# Patient Record
Sex: FEMALE | Race: WHITE | Employment: FULL TIME | URBAN - METROPOLITAN AREA
[De-identification: names, ages, dates, MRNs, and addresses within clinical notes are randomized per-mention and may not be internally consistent; named-entity substitution may affect disease eponyms.]

---
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
## 2019-12-01 ENCOUNTER — HOSPITAL ENCOUNTER (EMERGENCY)
Age: 19
Discharge: HOME | End: 2019-12-01
Attending: NURSE PRACTITIONER
Payer: COMMERCIAL

## 2019-12-01 VITALS
WEIGHT: 120 LBS | TEMPERATURE: 97.9 F | HEART RATE: 69 BPM | OXYGEN SATURATION: 98 % | RESPIRATION RATE: 16 BRPM | SYSTOLIC BLOOD PRESSURE: 113 MMHG | BODY MASS INDEX: 19.37 KG/M2 | DIASTOLIC BLOOD PRESSURE: 75 MMHG

## 2019-12-01 DIAGNOSIS — J02.9 ACUTE PHARYNGITIS, UNSPECIFIED ETIOLOGY: ICD-10-CM

## 2019-12-01 DIAGNOSIS — J01.90 ACUTE NON-RECURRENT SINUSITIS, UNSPECIFIED LOCATION: Primary | ICD-10-CM

## 2019-12-01 PROCEDURE — 99212 OFFICE O/P EST SF 10 MIN: CPT

## 2019-12-01 RX ORDER — AMOXICILLIN 500 MG/1
500 CAPSULE ORAL 3 TIMES DAILY
Qty: 30 CAPSULE | Refills: 0 | Status: SHIPPED | OUTPATIENT
Start: 2019-12-01 | End: 2019-12-11

## 2019-12-01 RX ORDER — METHYLPREDNISOLONE 4 MG/1
TABLET ORAL
Qty: 21 TABLET | Status: SHIPPED | OUTPATIENT
Start: 2019-12-01 | End: 2019-12-07

## 2020-08-04 ENCOUNTER — HOSPITAL ENCOUNTER (OUTPATIENT)
Age: 20
Discharge: HOME | End: 2020-08-06
Payer: COMMERCIAL

## 2020-08-04 ENCOUNTER — OFFICE VISIT (OUTPATIENT)
Dept: PRIMARY CARE CLINIC | Age: 20
End: 2020-08-04

## 2020-08-04 VITALS
WEIGHT: 126 LBS | HEIGHT: 66 IN | SYSTOLIC BLOOD PRESSURE: 118 MMHG | DIASTOLIC BLOOD PRESSURE: 72 MMHG | OXYGEN SATURATION: 97 % | BODY MASS INDEX: 20.25 KG/M2 | TEMPERATURE: 98.3 F | HEART RATE: 80 BPM

## 2020-08-04 PROBLEM — K51.90 ULCERATIVE COLITIS (HCC): Status: ACTIVE | Noted: 2020-08-04

## 2020-08-04 PROCEDURE — 86706 HEP B SURFACE ANTIBODY: CPT

## 2020-08-04 PROCEDURE — 86580 TB INTRADERMAL TEST: CPT | Performed by: FAMILY MEDICINE

## 2020-08-04 PROCEDURE — 86735 MUMPS ANTIBODY: CPT

## 2020-08-04 PROCEDURE — 86762 RUBELLA ANTIBODY: CPT

## 2020-08-04 PROCEDURE — 86787 VARICELLA-ZOSTER ANTIBODY: CPT

## 2020-08-04 PROCEDURE — 99213 OFFICE O/P EST LOW 20 MIN: CPT | Performed by: FAMILY MEDICINE

## 2020-08-04 PROCEDURE — 86765 RUBEOLA ANTIBODY: CPT

## 2020-08-04 RX ORDER — LEVONORGESTREL / ETHINYL ESTRADIOL AND ETHINYL ESTRADIOL 150-30(84)
KIT ORAL
COMMUNITY
Start: 2020-06-08 | End: 2021-03-24

## 2020-08-04 ASSESSMENT — ENCOUNTER SYMPTOMS
EYE REDNESS: 0
COUGH: 0
DIARRHEA: 0
ABDOMINAL PAIN: 0
COLOR CHANGE: 0
VOMITING: 0
CONSTIPATION: 0
NAUSEA: 0
BACK PAIN: 0
WHEEZING: 0
SHORTNESS OF BREATH: 0
CHEST TIGHTNESS: 0
BLOOD IN STOOL: 0

## 2020-08-04 NOTE — PROGRESS NOTES
Roberto Morgan  : 2000    Chief Complaint:     Chief Complaint   Patient presents with    School/Camp Physical     HPI  Physical for Nursing. History reviewed and no significant medical problems. Has UC but has been asymptomatic for 3 years, follows w GI, no meds. No physical limitations or chronic joint/back pains. No cough, hemoptysis etc symptoms/signs of communicable disease. See form in media. Tdap 2012. Past medical, surgical, family and social histories reviewed and updated today as appropriate    Health Maintenance Due   Topic Date Due    Varicella vaccine (1 of 2 - 2-dose childhood series) 2000    HPV vaccine (1 - 2-dose series) 2010    HIV screen  2014    Chlamydia screen  2015    DTaP/Tdap/Td vaccine (1 - Tdap) 2018       Current Outpatient Medications   Medication Sig Dispense Refill    Levonorgest-Eth Estrad 91-Day 0.15-0.03 &0.01 MG TABS TAKE 1 TABLET BY MOUTH EVERY DAY AS DIRECTED      loratadine-pseudoephedrine (CLARITIN-D 24 HOUR)  MG per extended release tablet Take 1 tablet by mouth daily (Patient not taking: Reported on 2020) 12 tablet 0     No current facility-administered medications for this visit. No Known Allergies      REVIEW OF SYSTEMS  Review of Systems   Constitutional: Negative for activity change, appetite change, chills, diaphoresis, fatigue and fever. HENT: Negative. Eyes: Negative for redness and visual disturbance. Respiratory: Negative for cough, chest tightness, shortness of breath and wheezing. Cardiovascular: Negative for chest pain, palpitations and leg swelling. Gastrointestinal: Negative for abdominal pain, blood in stool, constipation, diarrhea, nausea and vomiting. Endocrine: Negative. Negative for cold intolerance, heat intolerance, polydipsia, polyphagia and polyuria. Genitourinary: Negative. Musculoskeletal: Negative. Negative for arthralgias, back pain and myalgias. Skin: Negative for color change, pallor and rash. Neurological: Negative for dizziness, tremors, syncope, weakness, light-headedness, numbness and headaches. Psychiatric/Behavioral: Negative for confusion and dysphoric mood. The patient is not nervous/anxious. All other systems reviewed and are negative. PHYSICAL EXAM  /72   Pulse 80   Temp 98.3 °F (36.8 °C)   Ht 5' 6\" (1.676 m)   Wt 126 lb (57.2 kg)   SpO2 97%   BMI 20.34 kg/m²   Physical Exam  Vitals signs and nursing note reviewed. Constitutional:       General: She is not in acute distress. Appearance: Normal appearance. She is well-developed. She is not ill-appearing or diaphoretic. HENT:      Head: Normocephalic and atraumatic. Right Ear: Tympanic membrane, ear canal and external ear normal.      Left Ear: Tympanic membrane, ear canal and external ear normal.      Nose: Nose normal. No congestion or rhinorrhea. Mouth/Throat:      Mouth: Mucous membranes are moist.      Pharynx: Oropharynx is clear. No oropharyngeal exudate or posterior oropharyngeal erythema. Eyes:      General: No scleral icterus. Extraocular Movements: Extraocular movements intact. Conjunctiva/sclera: Conjunctivae normal.      Pupils: Pupils are equal, round, and reactive to light. Neck:      Musculoskeletal: Neck supple. Thyroid: No thyromegaly. Vascular: No JVD. Cardiovascular:      Rate and Rhythm: Normal rate and regular rhythm. Heart sounds: Normal heart sounds. No murmur. No friction rub. No gallop. Pulmonary:      Effort: Pulmonary effort is normal. No respiratory distress. Breath sounds: Normal breath sounds. No stridor. No wheezing, rhonchi or rales. Abdominal:      General: Bowel sounds are normal. There is no distension. Palpations: Abdomen is soft. There is no mass. Tenderness: There is no abdominal tenderness. There is no guarding or rebound. Hernia: No hernia is present.

## 2020-08-05 LAB — HBV SURFACE AB TITR SER: REACTIVE {TITER}

## 2020-08-06 LAB
INDURATION: NORMAL
MEASLES IMMUNE (IGG): NORMAL
MUMPS AB IGG: NORMAL
RUBELLA ANTIBODY IGG: NORMAL
TB SKIN TEST: NORMAL
VARICELLA-ZOSTER VIRUS AB, IGG: NORMAL

## 2020-08-17 ENCOUNTER — NURSE ONLY (OUTPATIENT)
Dept: PRIMARY CARE CLINIC | Age: 20
End: 2020-08-17
Payer: COMMERCIAL

## 2020-08-17 ENCOUNTER — NURSE ONLY (OUTPATIENT)
Dept: PRIMARY CARE CLINIC | Age: 20
End: 2020-08-17

## 2020-08-17 PROCEDURE — 90716 VAR VACCINE LIVE SUBQ: CPT | Performed by: NURSE PRACTITIONER

## 2020-08-17 PROCEDURE — 86580 TB INTRADERMAL TEST: CPT | Performed by: NURSE PRACTITIONER

## 2020-08-17 PROCEDURE — 90471 IMMUNIZATION ADMIN: CPT | Performed by: NURSE PRACTITIONER

## 2020-08-20 LAB
INDURATION: NORMAL
TB SKIN TEST: NORMAL

## 2020-09-17 ENCOUNTER — NURSE ONLY (OUTPATIENT)
Dept: PRIMARY CARE CLINIC | Age: 20
End: 2020-09-17
Payer: COMMERCIAL

## 2020-09-17 PROCEDURE — 90471 IMMUNIZATION ADMIN: CPT | Performed by: FAMILY MEDICINE

## 2020-09-17 PROCEDURE — 90716 VAR VACCINE LIVE SUBQ: CPT | Performed by: FAMILY MEDICINE

## 2020-09-17 NOTE — PATIENT INSTRUCTIONS
Patient Education        Varicella (Chickenpox) Vaccine: What You Need to Know  Why get vaccinated? Varicella vaccine can prevent chickenpox. Chickenpox can cause an itchy rash that usually lasts about a week. It can also cause fever, tiredness, loss of appetite, and headache. It can lead to skin infections, pneumonia, inflammation of the blood vessels, and swelling of the brain and/or spinal cord covering, and infections of the bloodstream, bone, or joints. Some people who get chickenpox get a painful rash called shingles (also known as herpes zoster) years later. Chickenpox is usually mild but it can be serious in infants under 15months of age, adolescents, adults, pregnant women, and people with a weakened immune system. Some people get so sick that they need to be hospitalized. It doesn't happen often, but people can die from chickenpox. Most people who are vaccinated with 2 doses of varicella vaccine will be protected for life. Varicella vaccine  Children need 2 doses of varicella vaccine, usually:  · First dose: 12 through 13months of age  · Second dose: 4 through 10years of age  Older children, adolescents, and adults also need 2 doses of varicella vaccine if they are not already immune to chickenpox. Varicella vaccine may be given at the same time as other vaccines. Also, a child between 13 months and 15years of age might receive varicella vaccine together with MMR (measles, mumps, and rubella) vaccine in a single shot, known as MMRV. Your health care provider can give you more information. Talk with your health care provider  Tell your vaccine provider if the person getting the vaccine:  · Has had an allergic reaction after a previous dose of varicella vaccine, or has any severe, life-threatening allergies. · Is pregnant, or thinks she might be pregnant. · Has a weakened immune system, or has a parent, brother, or sister with a history of hereditary or congenital immune system problems.   · Is taking salicylates (such as aspirin). · Has recently had a blood transfusion or received other blood products. · Has tuberculosis. · Has gotten any other vaccines in the past 4 weeks. In some cases, your health care provider may decide to postpone varicella vaccination to a future visit. People with minor illnesses, such as a cold, may be vaccinated. People who are moderately or severely ill should usually wait until they recover before getting varicella vaccine. Your health care provider can give you more information. Risks of a vaccine reaction  · Sore arm from the injection, fever, or redness or rash where the shot is given can happen after varicella vaccine. · More serious reactions happen very rarely. These can include pneumonia, infection of the brain and/or spinal cord covering, or seizures that are often associated with fever. · In people with serious immune system problems, this vaccine may cause an infection which may be life-threatening. People with serious immune system problems should not get varicella vaccine. It is possible for a vaccinated person to develop a rash. If this happens, the varicella vaccine virus could be spread to an unprotected person. Anyone who gets a rash should stay away from people with a weakened immune system and infants until the rash goes away. Talk with your health care provider to learn more. Some people who are vaccinated against chickenpox get shingles (herpes zoster) years later. This is much less common after vaccination than after chickenpox disease. People sometimes faint after medical procedures, including vaccination. Tell your provider if you feel dizzy or have vision changes or ringing in the ears. As with any medicine, there is a very remote chance of a vaccine causing a severe allergic reaction, other serious injury, or death. What if there is a serious problem? An allergic reaction could occur after the vaccinated person leaves the clinic.  If you see signs of a severe allergic reaction (hives, swelling of the face and throat, difficulty breathing, a fast heartbeat, dizziness, or weakness), call 9-1-1 and get the person to the nearest hospital.  For other signs that concern you, call your health care provider. Adverse reactions should be reported to the Vaccine Adverse Event Reporting System (VAERS). Your health care provider will usually file this report, or you can do it yourself. Visit the VAERS website at www.vaers. [facility].gov or call 4-304.579.7851. VAERS is only for reporting reactions, and VAERS staff do not give medical advice. The National Vaccine Injury Compensation Program  The National Vaccine Injury Compensation Program (VICP) is a federal program that was created to compensate people who may have been injured by certain vaccines. Visit the VICP website at www.hrsa.gov/vaccinecompensation or call 8-944.791.8841 to learn about the program and about filing a claim. There is a time limit to file a claim for compensation. How can I learn more? · Ask your health care provider. · Call your local or state health department. · Contact the Centers for Disease Control and Prevention (CDC):  ? Call 0-374.924.2095 (1-800-CDC-INFO) or  ? Visit CDC's www.cdc.gov/vaccines  Vaccine Information Statement (Interim)  Varicella Vaccine  08-  42 LINDA Analilia Haro 173QH-06  Department of Health and Human Services  Centers for Disease Control and Prevention  Many Vaccine Information Statements are available in [place] and other languages. See www.immunize.org/vis  Hojas de información sobre vacunas están disponibles en español y en muchos otros idiomas. Visite www.immunize.org/vis  Care instructions adapted under license by [facility] ([facility]). If you have questions about a medical condition or this instruction, always ask your healthcare professional. [address] any warranty or liability for your use of this information.

## 2020-10-09 ENCOUNTER — NURSE ONLY (OUTPATIENT)
Dept: PRIMARY CARE CLINIC | Age: 20
End: 2020-10-09
Payer: COMMERCIAL

## 2020-10-09 PROCEDURE — 90688 IIV4 VACCINE SPLT 0.5 ML IM: CPT | Performed by: FAMILY MEDICINE

## 2020-10-09 PROCEDURE — 90471 IMMUNIZATION ADMIN: CPT | Performed by: FAMILY MEDICINE

## 2021-06-03 ENCOUNTER — OFFICE VISIT (OUTPATIENT)
Dept: PRIMARY CARE CLINIC | Age: 21
End: 2021-06-03
Payer: COMMERCIAL

## 2021-06-03 VITALS
SYSTOLIC BLOOD PRESSURE: 100 MMHG | HEIGHT: 66 IN | BODY MASS INDEX: 19.61 KG/M2 | TEMPERATURE: 98.2 F | HEART RATE: 76 BPM | OXYGEN SATURATION: 98 % | WEIGHT: 122 LBS | DIASTOLIC BLOOD PRESSURE: 60 MMHG

## 2021-06-03 DIAGNOSIS — H73.92 TYMPANIC MEMBRANE DISORDER, LEFT: Primary | ICD-10-CM

## 2021-06-03 PROCEDURE — 99213 OFFICE O/P EST LOW 20 MIN: CPT | Performed by: NURSE PRACTITIONER

## 2021-06-03 ASSESSMENT — ENCOUNTER SYMPTOMS
SHORTNESS OF BREATH: 0
CONSTIPATION: 0
NAUSEA: 0
DIARRHEA: 0
COUGH: 0
VOMITING: 0
WHEEZING: 0

## 2021-06-03 NOTE — PROGRESS NOTES
Chief Complaint   Patient presents with    Ear Fullness     Lt ear x 3 days       HPI:  Patient presents today for  Complaints of left ear fullness x 3 days. Reports that she can hear a heartbeat in her ear. Denies any nasal congestion, runny nose or cough. Denies fever/chills or sick contacts. Prior to Visit Medications    Medication Sig Taking? Authorizing Provider   desogestrel-ethinyl estradiol (VELIVET) 0.1/0.125/0.15 -0.025 MG tablet Take 1 tablet by mouth daily Yes Wanda Delgado, APRN - CNP         No Known Allergies      Review of Systems  Review of Systems   Constitutional: Negative for chills and fever. HENT: Negative for congestion and nosebleeds. See HPI   Respiratory: Negative for cough, shortness of breath and wheezing. Cardiovascular: Negative for chest pain, palpitations and leg swelling. Gastrointestinal: Negative for constipation, diarrhea, nausea and vomiting. Genitourinary: Negative for dysuria and urgency. Musculoskeletal: Negative for neck pain. Neurological: Negative for headaches. VS:  /60   Pulse 76   Temp 98.2 °F (36.8 °C)   Ht 5' 6\" (1.676 m)   Wt 122 lb (55.3 kg)   SpO2 98%   BMI 19.69 kg/m²     Patient's medical, social, and family history reviewed      Physical Exam  Physical Exam  Constitutional:       Appearance: She is well-developed. HENT:      Head: Normocephalic. Left Ear: Tympanic membrane is retracted. Eyes:      Pupils: Pupils are equal, round, and reactive to light. Neck:      Thyroid: No thyromegaly. Cardiovascular:      Rate and Rhythm: Normal rate and regular rhythm. Pulmonary:      Effort: Pulmonary effort is normal.      Breath sounds: Normal breath sounds. Abdominal:      General: Bowel sounds are normal.      Palpations: Abdomen is soft. Musculoskeletal:         General: Normal range of motion. Cervical back: Normal range of motion and neck supple.    Lymphadenopathy:      Cervical: No cervical adenopathy. Skin:     General: Skin is warm and dry. Neurological:      Mental Status: She is alert and oriented to person, place, and time. Psychiatric:         Behavior: Behavior normal.           Assessment/Plan:    1.  Tympanic membrane disorder, left  OTC sudafed recommended for patient  Follow up if no improvement     Shaquille Chapman, [address] [facility] Nurse Practitioner          Shaquille Chapman, APRN - CNP

## 2021-08-03 ENCOUNTER — NURSE ONLY (OUTPATIENT)
Dept: PRIMARY CARE CLINIC | Age: 21
End: 2021-08-03
Payer: COMMERCIAL

## 2021-08-03 DIAGNOSIS — Z11.1 PPD SCREENING TEST: Primary | ICD-10-CM

## 2021-08-03 PROCEDURE — 86580 TB INTRADERMAL TEST: CPT | Performed by: NURSE PRACTITIONER

## 2021-08-05 LAB
INDURATION: NORMAL
TB SKIN TEST: NORMAL

## 2021-09-02 ENCOUNTER — OFFICE VISIT (OUTPATIENT)
Dept: PRIMARY CARE CLINIC | Age: 21
End: 2021-09-02

## 2021-09-02 VITALS
WEIGHT: 122 LBS | TEMPERATURE: 97.1 F | HEIGHT: 66 IN | OXYGEN SATURATION: 96 % | SYSTOLIC BLOOD PRESSURE: 128 MMHG | DIASTOLIC BLOOD PRESSURE: 76 MMHG | HEART RATE: 84 BPM | BODY MASS INDEX: 19.61 KG/M2 | RESPIRATION RATE: 16 BRPM

## 2021-09-02 DIAGNOSIS — R39.9 UTI SYMPTOMS: Primary | ICD-10-CM

## 2021-09-02 LAB
BILIRUBIN, POC: NEGATIVE
BLOOD URINE, POC: NEGATIVE
CLARITY, POC: CLEAR
COLOR, POC: YELLOW
CONTROL: NORMAL
GLUCOSE URINE, POC: NEGATIVE
KETONES, POC: NEGATIVE
LEUKOCYTE EST, POC: NEGATIVE
NITRITE, POC: NEGATIVE
PH, POC: 7
PREGNANCY TEST URINE, POC: NEGATIVE
PROTEIN, POC: NEGATIVE
SPECIFIC GRAVITY, POC: 1.02
UROBILINOGEN, POC: 0.2

## 2021-09-02 PROCEDURE — 81025 URINE PREGNANCY TEST: CPT | Performed by: NURSE PRACTITIONER

## 2021-09-02 PROCEDURE — 81002 URINALYSIS NONAUTO W/O SCOPE: CPT | Performed by: NURSE PRACTITIONER

## 2021-09-02 PROCEDURE — 99214 OFFICE O/P EST MOD 30 MIN: CPT | Performed by: NURSE PRACTITIONER

## 2021-09-02 RX ORDER — NITROFURANTOIN 25; 75 MG/1; MG/1
100 CAPSULE ORAL 2 TIMES DAILY
Qty: 10 CAPSULE | Refills: 0 | Status: SHIPPED | OUTPATIENT
Start: 2021-09-02 | End: 2021-09-07

## 2021-09-02 NOTE — PROGRESS NOTES
21  Eduar Montelongo : 2000 Sex: female  Age 24 y.o. Subjective:  Chief Complaint   Patient presents with    Abdominal Pain     x2 days     Urinary Frequency    Dysuria       HPI:   Eduar Montelongo , 24 y.o. female presents to the clinic for evaluation of UTI symptoms x 2 days. Reports associated frequency, urgency, and suprapubic pressure. The patient denies taking any treatment for symptoms. The patient reports unchanged symptoms over time. Denies gross hematuria. Mild associated bilateral flank pain. Denies vaginal discharge, vaginal bleeding, possibility of pregnancy, or lethargy. The patient also denies headache, fever, chest pain, abdominal pain, shortness of breath, and nausea / vomiting / diarrhea. Patient's last menstrual period was 2021. ROS:   Unless otherwise stated in this report the patient's positive and negative responses for review of systems for constitutional, eyes, ENT, cardiovascular, respiratory, gastrointestinal, neurological, , musculoskeletal, and integument systems and related systems to the presenting problem are either stated in the history of present illness or were not pertinent or were negative for the symptoms and/or complaints related to the presenting medical problem. Positives and pertinent negatives as per HPI. All others reviewed and are negative. PMH:     Past Medical History:   Diagnosis Date    Colitis        Past Surgical History:   Procedure Laterality Date    COLONOSCOPY         History reviewed. No pertinent family history.     Medications:     Current Outpatient Medications:     nitrofurantoin, macrocrystal-monohydrate, (MACROBID) 100 MG capsule, Take 1 capsule by mouth 2 times daily for 5 days, Disp: 10 capsule, Rfl: 0    desogestrel-ethinyl estradiol (VELIVET) 0.1/0.125/0.15 -0.025 MG tablet, Take 1 tablet by mouth daily, Disp: 28 tablet, Rfl: 3    Allergies:   No Known Allergies    Social History:     Social History Tobacco Use    Smoking status: Never Smoker    Smokeless tobacco: Never Used   Substance Use Topics    Alcohol use: Yes     Comment: socially    Drug use: No       Patient lives at home. Physical Exam:     Vitals:    09/02/21 1116   BP: 128/76   Pulse: 84   Resp: 16   Temp: 97.1 °F (36.2 °C)   TempSrc: Temporal   SpO2: 96%   Weight: 122 lb (55.3 kg)   Height: 5' 6\" (1.676 m)       Physical Exam (PE)   Constitutional: Alert, development consistent with age. HENT:      Head: Normocephalic. Right Ear: External ear normal.      Left Ear: External ear normal.      Nose: Normal.      Mouth/Throat:     Mouth: Mucous membranes are moist.      Pharynx: Oropharynx is clear. Eyes: Pupils: Pupils are equal, round, and reactive to light. Neck: Normal ROM. Supple. Cardiovascular: Heart RRR without pathologic murmurs or gallops. Pulmonary: Respiratory effort normal.  Normal breath sounds. Abdomen: Soft, nondistended, with mild suprapubic tenderness. No rebound, rigidity, or guarding. BS+ X4. No organomegaly. Back: Negative CVA tenderness. Skin:  Normal turgor. Warm, dry, without visible rash, unless noted elsewhere. Neurological:  Alert and oriented. Motor functions intact. Responds to verbal commands. Psychiatric: Mood and Affect: Mood normal. Behavior: Behavior normal    Testing:   (All laboratory and radiology results have been personally reviewed by myself)  Labs:  Results for orders placed or performed in visit on 09/02/21   POCT Urinalysis no Micro   Result Value Ref Range    Color, UA yellow     Clarity, UA clear     Glucose, UA POC negative     Bilirubin, UA negative     Ketones, UA negative     Spec Grav, UA 1.020     Blood, UA POC negative     pH, UA 7.0     Protein, UA POC negative     Urobilinogen, UA 0.2     Leukocytes, UA negative     Nitrite, UA negative        Imaging: All Radiology results interpreted by Radiologist unless otherwise noted.   No orders to display       Assessment / Plan:   The patient's vitals, allergies, medications, and past medical history have been reviewed. Hyun Chaudhry was seen today for abdominal pain, urinary frequency and dysuria. Diagnoses and all orders for this visit:    UTI symptoms  -     POCT Urinalysis no Micro  -     POCT urine pregnancy  -     Culture, Urine; Future  -     nitrofurantoin, macrocrystal-monohydrate, (MACROBID) 100 MG capsule; Take 1 capsule by mouth 2 times daily for 5 days        - Disposition: Home    - Educational material printed for patient's review and were included in patient instructions. After Visit Summary and given to patient at the end of visit. - Urine C&S pending, will call with results once available. Discussed symptomatic treatments with patient today. Increase fluids and rest. F/u PCP in 3-5 days if symptoms persist. ED sooner if symptoms worsen or change. ED immediately with the development of fever, shaking chills, body aches, flank pain, vomiting, CP, or SOB. Pt is in agreement with this care plan. All questions answered. SIGNATURE: Valentino Amble, APRN-CNP    *NOTE: This report was transcribed using voice recognition software. Every effort was made to ensure accuracy; however, inadvertent computerized transcription errors may be present.

## 2021-09-02 NOTE — PATIENT INSTRUCTIONS
Patient Education        Urinary Tract Infection (UTI) in Women: Care Instructions  Overview     A urinary tract infection, or UTI, is a general term for an infection anywhere between the kidneys and the urethra (where urine comes out). Most UTIs are bladder infections. They often cause pain or burning when you urinate. UTIs are caused by bacteria and can be cured with antibiotics. Be sure to complete your treatment so that the infection does not get worse. Follow-up care is a key part of your treatment and safety. Be sure to make and go to all appointments, and call your doctor if you are having problems. It's also a good idea to know your test results and keep a list of the medicines you take. How can you care for yourself at home? · Take your antibiotics as directed. Do not stop taking them just because you feel better. You need to take the full course of antibiotics. · Drink extra water and other fluids for the next day or two. This will help make the urine less concentrated and help wash out the bacteria that are causing the infection. (If you have kidney, heart, or liver disease and have to limit fluids, talk with your doctor before you increase the amount of fluids you drink.)  · Avoid drinks that are carbonated or have caffeine. They can irritate the bladder. · Urinate often. Try to empty your bladder each time. · To relieve pain, take a hot bath or lay a heating pad set on low over your lower belly or genital area. Never go to sleep with a heating pad in place. To prevent UTIs  · Drink plenty of water each day. This helps you urinate often, which clears bacteria from your system. (If you have kidney, heart, or liver disease and have to limit fluids, talk with your doctor before you increase the amount of fluids you drink.)  · Urinate when you need to. · If you are sexually active, urinate right after you have sex. · Change sanitary pads often.   · Avoid douches, bubble baths, feminine hygiene sprays, and other feminine hygiene products that have deodorants. · After going to the bathroom, wipe from front to back. When should you call for help? Call your doctor now or seek immediate medical care if:    · Symptoms such as fever, chills, nausea, or vomiting get worse or appear for the first time.     · You have new pain in your back just below your rib cage. This is called flank pain.     · There is new blood or pus in your urine.     · You have any problems with your antibiotic medicine. Watch closely for changes in your health, and be sure to contact your doctor if:    · You are not getting better after taking an antibiotic for 2 days.     · Your symptoms go away but then come back. Where can you learn more? Go to [email or web address] org and sign in to your Appier account. Enter M650 in the Fracture box to learn more about \"Urinary Tract Infection (UTI) in Women: Care Instructions. \"     If you do not have an account, please click on the \"Sign Up Now\" link. Current as of: February 10, 2021               Content Version: 12.9  © 0682-5157 Healthwise, Incorporated. Care instructions adapted under license by [facility] ([facility]). If you have questions about a medical condition or this instruction, always ask your healthcare professional. [address] any warranty or liability for your use of this information.

## 2021-09-07 ENCOUNTER — NURSE ONLY (OUTPATIENT)
Dept: PRIMARY CARE CLINIC | Age: 21
End: 2021-09-07
Payer: COMMERCIAL

## 2021-09-07 DIAGNOSIS — Z23 NEED FOR INFLUENZA VACCINATION: Primary | ICD-10-CM

## 2021-09-07 PROCEDURE — 90756 CCIIV4 VACC ABX FREE IM: CPT | Performed by: FAMILY MEDICINE

## 2021-09-07 PROCEDURE — 90471 IMMUNIZATION ADMIN: CPT | Performed by: FAMILY MEDICINE

## 2021-12-02 ENCOUNTER — IMMUNIZATION (OUTPATIENT)
Dept: PRIMARY CARE CLINIC | Age: 21
End: 2021-12-02
Payer: COMMERCIAL

## 2021-12-02 PROCEDURE — 0064A COVID-19, MODERNA BOOSTER VACCINE 0.25ML DOSE: CPT | Performed by: NURSE PRACTITIONER

## 2021-12-02 PROCEDURE — 91306 COVID-19, MODERNA BOOSTER VACCINE 0.25ML DOSE: CPT | Performed by: NURSE PRACTITIONER

## 2022-04-10 SDOH — HEALTH STABILITY: PHYSICAL HEALTH: ON AVERAGE, HOW MANY DAYS PER WEEK DO YOU ENGAGE IN MODERATE TO STRENUOUS EXERCISE (LIKE A BRISK WALK)?: 4 DAYS

## 2022-04-10 SDOH — HEALTH STABILITY: PHYSICAL HEALTH: ON AVERAGE, HOW MANY MINUTES DO YOU ENGAGE IN EXERCISE AT THIS LEVEL?: 50 MIN

## 2022-04-10 ASSESSMENT — SOCIAL DETERMINANTS OF HEALTH (SDOH)
WITHIN THE LAST YEAR, HAVE YOU BEEN AFRAID OF YOUR PARTNER OR EX-PARTNER?: NO
WITHIN THE LAST YEAR, HAVE YOU BEEN KICKED, HIT, SLAPPED, OR OTHERWISE PHYSICALLY HURT BY YOUR PARTNER OR EX-PARTNER?: NO
WITHIN THE LAST YEAR, HAVE YOU BEEN HUMILIATED OR EMOTIONALLY ABUSED IN OTHER WAYS BY YOUR PARTNER OR EX-PARTNER?: NO
WITHIN THE LAST YEAR, HAVE TO BEEN RAPED OR FORCED TO HAVE ANY KIND OF SEXUAL ACTIVITY BY YOUR PARTNER OR EX-PARTNER?: NO

## 2022-04-11 ENCOUNTER — HOSPITAL ENCOUNTER (OUTPATIENT)
Dept: CT IMAGING | Age: 22
Discharge: HOME | End: 2022-04-13
Payer: COMMERCIAL

## 2022-04-11 ENCOUNTER — OFFICE VISIT (OUTPATIENT)
Dept: PRIMARY CARE CLINIC | Age: 22
End: 2022-04-11
Payer: COMMERCIAL

## 2022-04-11 VITALS
BODY MASS INDEX: 20.25 KG/M2 | TEMPERATURE: 97.8 F | HEART RATE: 80 BPM | RESPIRATION RATE: 14 BRPM | OXYGEN SATURATION: 99 % | SYSTOLIC BLOOD PRESSURE: 100 MMHG | HEIGHT: 66 IN | DIASTOLIC BLOOD PRESSURE: 60 MMHG | WEIGHT: 126 LBS

## 2022-04-11 DIAGNOSIS — K51.911 ULCERATIVE COLITIS WITH RECTAL BLEEDING, UNSPECIFIED LOCATION (HCC): Primary | ICD-10-CM

## 2022-04-11 DIAGNOSIS — Z76.89 ENCOUNTER TO ESTABLISH CARE: ICD-10-CM

## 2022-04-11 DIAGNOSIS — K51.911 ULCERATIVE COLITIS WITH RECTAL BLEEDING, UNSPECIFIED LOCATION (HCC): ICD-10-CM

## 2022-04-11 DIAGNOSIS — L70.0 ACNE VULGARIS: ICD-10-CM

## 2022-04-11 PROCEDURE — 74177 CT ABD & PELVIS W/CONTRAST: CPT

## 2022-04-11 PROCEDURE — 2580000003 HC RX 258: Performed by: RADIOLOGY

## 2022-04-11 PROCEDURE — 6360000004 HC RX CONTRAST MEDICATION: Performed by: RADIOLOGY

## 2022-04-11 PROCEDURE — 99204 OFFICE O/P NEW MOD 45 MIN: CPT | Performed by: PHYSICIAN ASSISTANT

## 2022-04-11 RX ORDER — SODIUM CHLORIDE 0.9 % (FLUSH) 0.9 %
10 SYRINGE (ML) INJECTION PRN
Status: DISCONTINUED | OUTPATIENT
Start: 2022-04-11 | End: 2022-04-14 | Stop reason: HOSPADM

## 2022-04-11 RX ADMIN — IOPAMIDOL 75 ML: 755 INJECTION, SOLUTION INTRAVENOUS at 15:44

## 2022-04-11 RX ADMIN — SODIUM CHLORIDE, PRESERVATIVE FREE 10 ML: 5 INJECTION INTRAVENOUS at 15:44

## 2022-04-11 SDOH — ECONOMIC STABILITY: FOOD INSECURITY: WITHIN THE PAST 12 MONTHS, THE FOOD YOU BOUGHT JUST DIDN'T LAST AND YOU DIDN'T HAVE MONEY TO GET MORE.: NEVER TRUE

## 2022-04-11 SDOH — ECONOMIC STABILITY: FOOD INSECURITY: WITHIN THE PAST 12 MONTHS, YOU WORRIED THAT YOUR FOOD WOULD RUN OUT BEFORE YOU GOT MONEY TO BUY MORE.: NEVER TRUE

## 2022-04-11 ASSESSMENT — PATIENT HEALTH QUESTIONNAIRE - PHQ9
SUM OF ALL RESPONSES TO PHQ QUESTIONS 1-9: 0
2. FEELING DOWN, DEPRESSED OR HOPELESS: 0
1. LITTLE INTEREST OR PLEASURE IN DOING THINGS: 0
SUM OF ALL RESPONSES TO PHQ QUESTIONS 1-9: 0
SUM OF ALL RESPONSES TO PHQ9 QUESTIONS 1 & 2: 0

## 2022-04-11 ASSESSMENT — SOCIAL DETERMINANTS OF HEALTH (SDOH): HOW HARD IS IT FOR YOU TO PAY FOR THE VERY BASICS LIKE FOOD, HOUSING, MEDICAL CARE, AND HEATING?: NOT HARD AT ALL

## 2022-04-11 NOTE — PROGRESS NOTES
Spoke with patient. Having a possible flare of ulcerative colitis. She was a previous pediatric patient. Has not had treatment since 2018. Informed patient to come into office tomorrow in [facility]. Will evaluate patient at that time.

## 2022-04-11 NOTE — PROGRESS NOTES
HPI:  The patient is a 24 y.o. female who presents today to establish care. The patient complains of rectal bleeding for 2 weeks. She has mucus with stool and bright red blood. She has mild left lower quadrant pain. She has a hx of Ulcerative Colitis. She was seeing a pediatric GI, but it has been several years. She believes her last colonoscopy was in 2018. She was weaned off meds, and she has done well until now. She has not had fevers. Past Medical History:   Diagnosis Date    Colitis       Past Surgical History:   Procedure Laterality Date    COLONOSCOPY        History reviewed. No pertinent family history. Social History     Socioeconomic History    Marital status: Single     Spouse name: Not on file    Number of children: Not on file    Years of education: Not on file    Highest education level: Not on file   Occupational History    Not on file   Tobacco Use    Smoking status: Never Smoker    Smokeless tobacco: Never Used   Substance and Sexual Activity    Alcohol use: Yes     Comment: socially    Drug use: No    Sexual activity: Never   Other Topics Concern    Not on file   Social History Narrative    Not on file     Social Determinants of Health     Financial Resource Strain: Low Risk     Difficulty of Paying Living Expenses: Not hard at all   Food Insecurity: No Food Insecurity    Worried About [facility] in the Last Year: Never true    [facility] in the Last Year: Never true   Transportation Needs:     Lack of Transportation (Medical): Not on file    Lack of Transportation (Non-Medical):  Not on file   Physical Activity: Sufficiently Active    Days of Exercise per Week: 4 days    Minutes of Exercise per Session: 50 min   Stress:     Feeling of Stress : Not on file   Social Connections:     Frequency of Communication with Friends and Family: Not on file    Frequency of Social Gatherings with Friends and Family: Not on file    Attends Episcopal Services: Not on file   [facility] or Organizations: Not on file    Attends Club or Organization Meetings: Not on file    Marital Status: Not on file   Intimate Partner Violence: Not At Risk    Fear of Current or Ex-Partner: No    Emotionally Abused: No    Physically Abused: No    Sexually Abused: No   Housing Stability:     Unable to Pay for Housing in the Last Year: Not on file    Number of [place] in the Last Year: Not on file    Unstable Housing in the Last Year: Not on file      No Known Allergies     Review of Systems:  Constitutional:  No fever, no fatigue, no chills, no headaches, no weight change  Dermatology:  No rash, no mole, no dry or sensitive skin  ENT:  No cough, no sore throat, no sinus pain, no runny nose, no ear pain  Cardiology:  No chest pain, no palpitations, no leg edema, no shortness of breath, no PND  Endocrinology:  No polydipsia, no polyuria, no cold intolerance, no heat intolerance, no polyphagia, no hair changes  Gastroenterology:  No dysphagia, no abdominal pain, no nausea, no vomiting, no constipation, + diarrhea, no heartburn  Female Reproductive:  No hot flashes, no abnormal vaginal discharge, no pain with menstruation, no pelvic pain  Musculoskeletal:  No joint pain, no leg cramps, no back pain, no muscle aches  Respiratory:  No shortness of breath, no orthopnea, no wheezing, no GOMEZ, no hemoptysis  Urology:  No blood in the urine, no urinary frequency, no urinary incontinence, no urinary urgency, no nocturia, no dysuria  Neurology:  No numbness/tingling, no dizziness, no weakness  Psychology:  No depression, no sleep disturbances, no suicidal ideation, no anxiety    Vitals:    04/11/22 1354   BP: 100/60   Pulse: 80   Resp: 14   Temp: 97.8 °F (36.6 °C)   SpO2: 99%   Weight: 126 lb (57.2 kg)   Height: 5' 6\" (1.676 m)       Physical Exam  Constitutional:       General: She is not in acute distress. Appearance: She is well-developed.    HENT:      Head: Normocephalic and atraumatic. Right Ear: External ear normal.      Left Ear: External ear normal.      Nose: Nose normal.   Eyes:      General: No scleral icterus. Conjunctiva/sclera: Conjunctivae normal.      Pupils: Pupils are equal, round, and reactive to light. Neck:      Thyroid: No thyromegaly. Cardiovascular:      Rate and Rhythm: Normal rate and regular rhythm. Heart sounds: Normal heart sounds. No murmur heard. Pulmonary:      Effort: Pulmonary effort is normal. No accessory muscle usage or respiratory distress. Breath sounds: Normal breath sounds. No wheezing. Abdominal:      General: Bowel sounds are normal. There is no distension. Palpations: Abdomen is soft. There is no mass. Tenderness: There is abdominal tenderness (llq). There is no guarding or rebound. Musculoskeletal:         General: Normal range of motion. Cervical back: Normal range of motion and neck supple. Skin:     General: Skin is warm and dry. Findings: No rash. Neurological:      Mental Status: She is alert and oriented to person, place, and time. Deep Tendon Reflexes: Reflexes are normal and symmetric. Psychiatric:         Speech: Speech normal.         Behavior: Behavior normal.         Assessment/Plan:      [facility] was [facility] today for new patient, rectal bleeding and other. Diagnoses and all orders for this visit:    Ulcerative colitis with rectal bleeding, unspecified location [facility])  -     CT ABDOMEN PELVIS W IV CONTRAST Additional Contrast? None; Future  -     CBC with Auto Differential; Future  -     Basic Metabolic Panel; Future    Encounter to establish care    Acne vulgaris  -on aldactone per Derm  Addendum: Discussed case with Guanako Gutierrez CNP, they are going to see her on Wed and will treat her with corticosteroid. As above. Call or go to ED immediately if symptoms worsen or persist.  Return in about 4 weeks (around 5/9/2022). , or sooner if necessary.       Educational materials and/or home exercises printed for patient's review and were included in patient instructions on his/her After Visit Summary and given to patient at the end of visit. Counseled regarding above diagnosis, including possible risks and complications,  especially if left uncontrolled. Counseled regarding the possible side effects, risks, benefits and alternatives to treatment; patient and/or guardian verbalizes understanding, agrees, feels comfortable with and wishes to proceed with above treatment plan. Advised patient to call with any new medication issues, and read all Rx info from pharmacy to assure aware of all possible risks and side effects of medication before taking. Reviewed age and gender appropriate health screening exams and vaccinations. Advised patient regarding importance of keeping up with recommended health maintenance and to schedule as soon as possible if overdue, as this is important in assessing for undiagnosed pathology, especially cancer, as well as protecting against potentially harmful/life threatening disease. Patient and/or guardian verbalizes understanding and agrees with above counseling, assessment and plan. All questions answered. Romana Dunning, PA-C  4/11/2022    I have personally reviewed and updated the chief complaint, HPI, Past Medical, Family and Social History, as well as the above Review of Systems.

## 2022-04-12 ENCOUNTER — INITIAL CONSULT (OUTPATIENT)
Dept: GASTROENTEROLOGY | Age: 22
End: 2022-04-12
Payer: COMMERCIAL

## 2022-04-12 VITALS — BODY MASS INDEX: 19.93 KG/M2 | HEIGHT: 66 IN | WEIGHT: 124 LBS

## 2022-04-12 DIAGNOSIS — K51.919 ULCERATIVE COLITIS WITH COMPLICATION, UNSPECIFIED LOCATION (HCC): Primary | ICD-10-CM

## 2022-04-12 PROCEDURE — 99202 OFFICE O/P NEW SF 15 MIN: CPT | Performed by: NURSE PRACTITIONER

## 2022-04-12 RX ORDER — M-VIT,TX,IRON,MINS/CALC/FOLIC 27MG-0.4MG
1 TABLET ORAL DAILY
COMMUNITY

## 2022-04-12 RX ORDER — PREDNISONE 10 MG/1
TABLET ORAL
Qty: 100 TABLET | Refills: 0 | Status: SHIPPED | OUTPATIENT
Start: 2022-04-12 | End: 2022-05-22

## 2022-04-12 RX ORDER — SODIUM, POTASSIUM,MAG SULFATES 17.5-3.13G
1 SOLUTION, RECONSTITUTED, ORAL ORAL ONCE
Qty: 1 EACH | Refills: 0 | Status: SHIPPED | OUTPATIENT
Start: 2022-04-12 | End: 2022-04-12

## 2022-04-12 RX ORDER — MESALAMINE 1.2 G/1
3600 TABLET, DELAYED RELEASE ORAL
Qty: 90 TABLET | Refills: 2 | Status: SHIPPED
Start: 2022-04-12 | End: 2022-05-25 | Stop reason: SDUPTHER

## 2022-04-12 RX ORDER — MESALAMINE 0.38 G/1
1.5 CAPSULE, EXTENDED RELEASE ORAL DAILY
Qty: 120 CAPSULE | Refills: 3 | Status: SHIPPED
Start: 2022-04-12 | End: 2022-04-12 | Stop reason: ALTCHOICE

## 2022-04-12 NOTE — PROGRESS NOTES
Laterality Date    COLONOSCOPY        Family History   Problem Relation Age of Onset    Thyroid Disease Father         Lab Results   Component Value Date    WBC 4.9 10/21/2017    HGB 14.4 10/21/2017    HCT 43.2 10/21/2017    MCV 90.9 10/21/2017     10/21/2017      Lab Results   Component Value Date     04/14/2022    K 4.2 04/14/2022     04/14/2022    CO2 25 04/14/2022    BUN 15 04/14/2022    CREATININE 0.9 04/14/2022    GLUCOSE 82 04/14/2022    CALCIUM 9.7 04/14/2022    PROT 7.8 04/14/2022    LABALBU 4.5 04/14/2022    BILITOT 0.4 04/14/2022    ALKPHOS 48 04/14/2022    AST 19 04/14/2022    ALT 8 04/14/2022    LABGLOM >60 04/14/2022    GFRAA >60 04/14/2022                       ASSESSMENT/PLAN:    1. Ulcerative colitis with complication, unspecified [facility]  -     Sedimentation Rate; Future  -     C-Reactive Protein; Future  -     Tissue Transglutaminase, IgA; Future  -     IgA; Future  -     Iron and TIBC; Future  -     Comprehensive Metabolic Panel; Future    -Patient is most likely in a flare of UC  -Patient will start on a Prednisone taper. Instructions given  -Moving forward the patient will start on Mesalamine as a treatment for the UC. Instructions given. -Routine labs ordered  -Will proceed with surveillance colonoscopy. Patient is agreeable. Schedule surveillance colonoscopy under MAC anesthesia. Literature given. Periprocedural hydration advised. The risks and alternatives were explained as per the ASGE guidelines (I.  E.  Perforation, 3% chance of bleeding, reaction to medication, missed colon lesions,  infections, and death). Return for IBD follow up. An electronic signature was used to authenticate this note.     --PRABHAKAR Adames - CNP

## 2022-04-14 ENCOUNTER — NURSE ONLY (OUTPATIENT)
Dept: PRIMARY CARE CLINIC | Age: 22
End: 2022-04-14

## 2022-04-14 DIAGNOSIS — K51.919 ULCERATIVE COLITIS WITH COMPLICATION, UNSPECIFIED LOCATION (HCC): ICD-10-CM

## 2022-04-14 LAB
ALBUMIN SERPL-MCNC: 4.5 G/DL (ref 3.5–5.2)
ALP BLD-CCNC: 48 U/L (ref 35–104)
ALT SERPL-CCNC: 8 U/L (ref 0–32)
ANION GAP SERPL CALCULATED.3IONS-SCNC: 13 MMOL/L (ref 7–16)
AST SERPL-CCNC: 19 U/L (ref 0–31)
BILIRUB SERPL-MCNC: 0.4 MG/DL (ref 0–1.2)
BUN BLDV-MCNC: 15 MG/DL (ref 6–20)
C-REACTIVE PROTEIN: 0.3 MG/DL (ref 0–0.4)
CALCIUM SERPL-MCNC: 9.7 MG/DL (ref 8.6–10.2)
CHLORIDE BLD-SCNC: 104 MMOL/L (ref 98–107)
CO2: 25 MMOL/L (ref 22–29)
CREAT SERPL-MCNC: 0.9 MG/DL (ref 0.5–1)
GFR AFRICAN AMERICAN: >60
GFR NON-AFRICAN AMERICAN: >60 ML/MIN/1.73
GLUCOSE BLD-MCNC: 82 MG/DL (ref 74–99)
IRON SATURATION: 45 % (ref 15–50)
IRON: 178 MCG/DL (ref 37–145)
POTASSIUM SERPL-SCNC: 4.2 MMOL/L (ref 3.5–5)
SEDIMENTATION RATE, ERYTHROCYTE: 2 MM/HR (ref 0–20)
SODIUM BLD-SCNC: 142 MMOL/L (ref 132–146)
TOTAL IRON BINDING CAPACITY: 392 MCG/DL (ref 250–450)
TOTAL PROTEIN: 7.8 G/DL (ref 6.4–8.3)

## 2022-04-15 LAB — IGA: 302 MG/DL (ref 70–400)

## 2022-04-18 LAB — TISSUE TRANSGLUTAMINASE IGA: 0.5 U/ML

## 2022-04-25 ENCOUNTER — OFFICE VISIT (OUTPATIENT)
Dept: PRIMARY CARE CLINIC | Age: 22
End: 2022-04-25

## 2022-04-25 VITALS
WEIGHT: 120 LBS | TEMPERATURE: 98.8 F | HEIGHT: 66 IN | HEART RATE: 69 BPM | BODY MASS INDEX: 19.29 KG/M2 | DIASTOLIC BLOOD PRESSURE: 62 MMHG | OXYGEN SATURATION: 98 % | SYSTOLIC BLOOD PRESSURE: 106 MMHG

## 2022-04-25 DIAGNOSIS — J06.9 VIRAL URI WITH COUGH: Primary | ICD-10-CM

## 2022-04-25 PROCEDURE — 99213 OFFICE O/P EST LOW 20 MIN: CPT | Performed by: NURSE PRACTITIONER

## 2022-04-25 RX ORDER — BROMPHENIRAMINE MALEATE, PSEUDOEPHEDRINE HYDROCHLORIDE, AND DEXTROMETHORPHAN HYDROBROMIDE 2; 30; 10 MG/5ML; MG/5ML; MG/5ML
5 SYRUP ORAL 4 TIMES DAILY PRN
Qty: 120 ML | Refills: 0 | Status: SHIPPED
Start: 2022-04-25 | End: 2022-05-25

## 2022-04-25 NOTE — PROGRESS NOTES
Chief Complaint:   Cough (sx X4-5 days) and Congestion    History of Present Illness   Source of history provided by:  patient. Abram Martines is a 24 y.o. old female who presents to walk-in with a cough for the past 5 days. States the cough is productive with yellow sputum. Reports associated nasal congestion and bilateral ear pain with \"popping\" sensation. Denies any fever, chills, N/V/D, abdominal pain, CP, progressive SOB, dizziness, or lethargy. Has not been taking anything for the symptoms. Review of Systems    Unless otherwise stated in this report or unable to obtain because of the patient's clinical or mental status as evidenced by the medical record, this patients's positive and negative responses for Review of Systems, constitutional, psych, eyes, ENT, cardiovascular, respiratory, gastrointestinal, neurological, genitourinary, musculoskeletal, integument systems and systems related to the presenting problem are either stated in the preceding or were not pertinent or were negative for the symptoms and/or complaints related to the medical problem. Past Medical History:  has a past medical history of Colitis. Past Surgical History:  has a past surgical history that includes Colonoscopy. Social History:  reports that she has never smoked. She has never used smokeless tobacco. She reports current alcohol use. She reports that she does not use drugs. Family History: family history includes Thyroid Disease in her father. Allergies: Patient has no known allergies. Physical Exam   Vital Signs:  /62   Pulse 69   Temp 98.8 °F (37.1 °C) (Temporal)   Ht 5' 6\" (1.676 m)   Wt 120 lb (54.4 kg)   SpO2 98%   BMI 19.37 kg/m²    Oxygen Saturation Interpretation: Normal.    Constitutional:  Alert, development consistent with age. Ears:  External Ears: Normal bilateral pinna. TM's & External Canals: TM's normal bilaterally without perforation.   Canals without erythema or drainage. Nose:   There is no obvious septal defect. Mouth:  Moist bucca mucosa and normal tongue. Throat: Mild posterior pharyngeal erythema without exudates or lesions. Neck:  Supple. There is no obvious adenopathy or neck tenderness. Lungs:   Breath sounds: Normal chest expansion and breath sounds noted throughout. No wheezes, rales, or rhonchi noted. Heart:  Regular rate and rhythm, normal heart sounds, without pathological murmurs, ectopy, gallops, or rubs. Abdomen:  Soft, nontender, good bowel sounds. No firm or pulsatile mass. Skin:  Normal turgor. Warm, dry, without visible rash. Neurological:  Oriented. Motor functions intact. Test Results Section   (All laboratory and radiology results have been personally reviewed by myself)  Labs:  No results found for this visit on 04/25/22. Imaging: All Radiology results interpreted by Radiologist unless otherwise noted. No results found. Assessment / Plan   Impression(s):  Bonilla Florentino was seen today for cough and congestion. Diagnoses and all orders for this visit:    Viral URI with cough  -     brompheniramine-pseudoephedrine-DM (BROMFED DM) 2-30-10 MG/5ML syrup; Take 5 mLs by mouth 4 times daily as needed for Congestion or Cough    Patient advised this is likely a viral infection that should resolve with time and conservative treatment. Prescription sent for Bromfed-DM, side effects discussed. Pt advised to f/u with PCP in 7-10 days for recheck if symptoms persist.  ER if changes or worse. Red flag symptoms discussed. Advised to take all medications as directed. Patient verbalized understanding and agreeable to plan of care. All questions answered. Return if symptoms worsen or fail to improve. Electronically signed by PRABHAKAR Marquez CNP   DD: 4/25/22    **This report was transcribed using voice recognition software.  Every effort was made to ensure accuracy; however, inadvertent computerized transcription errors may be present.

## 2022-04-27 ENCOUNTER — TELEPHONE (OUTPATIENT)
Dept: GASTROENTEROLOGY | Age: 22
End: 2022-04-27

## 2022-04-27 NOTE — TELEPHONE ENCOUNTER
Prior Authorization Form:      DEMOGRAPHICS:                     Patient Name:  Ricco Bundy  Patient :  2000            Insurance:  Payor: Jose Thomas / Plan: [address] [place] [address] / Product Type: *No Product type* /   Insurance ID Number:    Payor/Plan Subscr  Sex Relation Sub. Ins. ID Effective Group Num   1.  Cullom Cris J 1964 Male Child [id number]                                   [address]         DIAGNOSIS & PROCEDURE:                       Procedure/Operation: Colonoscopy           CPT Code: 30660    Diagnosis:  Ulcerative Colitis    ICD10 Code: K51.90    Location:  [facility]    Surgeon:  Dr. Nona Cates     SCHEDULING INFORMATION:                          Date: 10/07/2022    Time: 0915              Anesthesia:  MAC/TIVA                                                       Status:  Outpatient          Electronically signed by Felicia Corbin MA on 2022 at 9:17 AM

## 2022-05-05 RX ORDER — POLYETHYLENE GLYCOL 3350, SODIUM SULFATE ANHYDROUS, SODIUM BICARBONATE, SODIUM CHLORIDE, POTASSIUM CHLORIDE 236; 22.74; 6.74; 5.86; 2.97 G/4L; G/4L; G/4L; G/4L; G/4L
1 POWDER, FOR SOLUTION ORAL AS NEEDED
Qty: 1 EACH | Refills: 0 | Status: SHIPPED
Start: 2022-05-05 | End: 2022-05-25

## 2022-05-12 DIAGNOSIS — K51.919 ULCERATIVE COLITIS WITH COMPLICATION, UNSPECIFIED LOCATION (HCC): Primary | ICD-10-CM

## 2022-05-25 ENCOUNTER — OFFICE VISIT (OUTPATIENT)
Dept: GASTROENTEROLOGY | Age: 22
End: 2022-05-25
Payer: COMMERCIAL

## 2022-05-25 VITALS
SYSTOLIC BLOOD PRESSURE: 123 MMHG | OXYGEN SATURATION: 99 % | RESPIRATION RATE: 16 BRPM | HEART RATE: 78 BPM | TEMPERATURE: 98.2 F | WEIGHT: 125.6 LBS | HEIGHT: 66 IN | DIASTOLIC BLOOD PRESSURE: 84 MMHG | BODY MASS INDEX: 20.18 KG/M2

## 2022-05-25 DIAGNOSIS — K51.80 OTHER ULCERATIVE COLITIS WITHOUT COMPLICATION (HCC): Primary | ICD-10-CM

## 2022-05-25 PROCEDURE — 99213 OFFICE O/P EST LOW 20 MIN: CPT | Performed by: STUDENT IN AN ORGANIZED HEALTH CARE EDUCATION/TRAINING PROGRAM

## 2022-05-25 RX ORDER — POLYETHYLENE GLYCOL 3350, SODIUM CHLORIDE, SODIUM BICARBONATE, POTASSIUM CHLORIDE 420; 11.2; 5.72; 1.48 G/4L; G/4L; G/4L; G/4L
4000 POWDER, FOR SOLUTION ORAL ONCE
Qty: 4000 ML | Refills: 0 | Status: SHIPPED | OUTPATIENT
Start: 2022-05-25 | End: 2022-05-25

## 2022-05-25 RX ORDER — HYDROCORTISONE ACETATE 25 MG/1
25 SUPPOSITORY RECTAL EVERY 12 HOURS
Qty: 28 SUPPOSITORY | Refills: 5 | Status: SHIPPED | OUTPATIENT
Start: 2022-05-25

## 2022-05-25 RX ORDER — MESALAMINE 1.2 G/1
4800 TABLET, DELAYED RELEASE ORAL
Qty: 120 TABLET | Refills: 5 | Status: SHIPPED | OUTPATIENT
Start: 2022-05-25

## 2022-05-26 RX ORDER — PREDNISONE 10 MG/1
10 TABLET ORAL DAILY
Qty: 100 TABLET | Refills: 0 | Status: SHIPPED | OUTPATIENT
Start: 2022-05-26 | End: 2022-06-01 | Stop reason: ALTCHOICE

## 2022-05-26 RX ORDER — PREDNISONE 1 MG/1
5 TABLET ORAL DAILY
Qty: 20 TABLET | Refills: 5 | Status: SHIPPED | OUTPATIENT
Start: 2022-05-26 | End: 2022-06-01 | Stop reason: ALTCHOICE

## 2022-06-01 RX ORDER — POLYETHYLENE GLYCOL 3350, SODIUM CHLORIDE, SODIUM BICARBONATE, POTASSIUM CHLORIDE 420; 11.2; 5.72; 1.48 G/4L; G/4L; G/4L; G/4L
1 POWDER, FOR SOLUTION ORAL ONCE
COMMUNITY
Start: 2022-05-25

## 2022-06-01 NOTE — PROGRESS NOTES
[address] PRE-ADMISSION TESTING   ENDOSCOPY/ COLONSCOPY INSTRUCTIONS  PAT- Phone Number: [phone]    ENDOSCOPY/ COLONSCOPY INSTRUCTIONS:     [x] Bowel Prep instructions reviewed. [x] Colonoscopy- The day prior: No solid foods. Clear liquids only. [x] Nothing by mouth after midnight. Including no gum, candy, mints, or water. [x] You may brush your teeth, gargle, but do NOT swallow water. [x] Do not wear makeup, lotions, powders, deodorant. No contact lens. [x] Arrange transportation with a responsible adult  to and from the hospital. If you do not have a responsible adult  to transport you, you will need to make arrangements with a medical transportation company. Arrange for someone to be with you for the remainder of the day and for 24 hours after your procedure due to having had anesthesia. -Who will be your  for transportation? _mother_________________   -Who will be staying with you for 24 hrs after your procedure? _parents_________________  * Bring urine specimen. PARKING INSTRUCTIONS:     [x] ARRIVAL TIME: 1300    · [x] Enter into the [employer]. Two people may accompany you. Masks are required. · [x] Parking Lot \"I\" is where you will park. It is located on the corner of [facility] [facility] and [address]. The entrance is on [address]. · To enter, press the button and the gate will lift. A free token will be provided to exit the lot. EDUCATION INSTRUCTIONS:    [] Bring a complete list of your medications, please write the last time you took the medicine, give this list to the nurse.  [] Take the following medications the morning of surgery with 1-2 ounces of water:none   [] Stop herbal supplements and vitamins 5 days before your surgery. [] DO NOT take any diabetic medicine the morning of surgery. Follow instructions for insulin the day before surgery.   [] If you are diabetic and your blood sugar is low or you feel symptomatic, you may drink 1-2 ounces of apple juice or take a glucose tablet. The morning of your procedure, you may call the pre-op area if you have concerns about your blood sugar [phone]. [] Use your inhalers the morning of surgery. Bring your emergency inhaler with you day of surgery. [] Follow physician instructions regarding any blood thinners you may be taking. WHAT TO EXPECT:    [x] The day of your procedure you will be greeted and checked in by the [employer].  In addition, you will be registered in the [facility] by a Patient Access Representative. Please bring your photo ID and insurance card. A nurse will greet you in accordance to the time you are needed in the pre-op area to prepare you for surgery. Please do not be discouraged if you are not greeted in the order you arrive as there are many variables that are involved in patient preparation. Your patience is greatly appreciated as you wait for your nurse. Please bring in items such as: books, magazines, newspapers, electronics, or any other items  to occupy your time in the waiting area. [x]  Delays may occur. Staff will make a sincere effort to keep you informed of delays. If any delays occur with your procedure, we apologize ahead of time for your inconvenience as we recognize the value of your time.

## 2022-06-08 ENCOUNTER — HOSPITAL ENCOUNTER (OUTPATIENT)
Age: 22
Setting detail: OUTPATIENT SURGERY
Discharge: HOME | End: 2022-06-08
Attending: STUDENT IN AN ORGANIZED HEALTH CARE EDUCATION/TRAINING PROGRAM | Admitting: STUDENT IN AN ORGANIZED HEALTH CARE EDUCATION/TRAINING PROGRAM
Payer: COMMERCIAL

## 2022-06-08 ENCOUNTER — ANESTHESIA (OUTPATIENT)
Dept: ENDOSCOPY | Age: 22
End: 2022-06-08
Payer: COMMERCIAL

## 2022-06-08 ENCOUNTER — ANESTHESIA EVENT (OUTPATIENT)
Dept: ENDOSCOPY | Age: 22
End: 2022-06-08
Payer: COMMERCIAL

## 2022-06-08 VITALS
RESPIRATION RATE: 16 BRPM | SYSTOLIC BLOOD PRESSURE: 103 MMHG | WEIGHT: 125 LBS | TEMPERATURE: 97.6 F | OXYGEN SATURATION: 100 % | BODY MASS INDEX: 20.09 KG/M2 | HEIGHT: 66 IN | HEART RATE: 72 BPM | DIASTOLIC BLOOD PRESSURE: 71 MMHG

## 2022-06-08 DIAGNOSIS — Z01.812 PRE-OPERATIVE LABORATORY EXAMINATION: Primary | ICD-10-CM

## 2022-06-08 DIAGNOSIS — K51.90 MILD CHRONIC ULCERATIVE COLITIS WITHOUT COMPLICATION (HCC): ICD-10-CM

## 2022-06-08 LAB
HCG, URINE, POC: NEGATIVE
Lab: NORMAL
NEGATIVE QC PASS/FAIL: NORMAL
POSITIVE QC PASS/FAIL: NORMAL

## 2022-06-08 PROCEDURE — 7100000011 HC PHASE II RECOVERY - ADDTL 15 MIN: Performed by: STUDENT IN AN ORGANIZED HEALTH CARE EDUCATION/TRAINING PROGRAM

## 2022-06-08 PROCEDURE — 3700000001 HC ADD 15 MINUTES (ANESTHESIA): Performed by: STUDENT IN AN ORGANIZED HEALTH CARE EDUCATION/TRAINING PROGRAM

## 2022-06-08 PROCEDURE — 3700000000 HC ANESTHESIA ATTENDED CARE: Performed by: STUDENT IN AN ORGANIZED HEALTH CARE EDUCATION/TRAINING PROGRAM

## 2022-06-08 PROCEDURE — 2580000003 HC RX 258: Performed by: STUDENT IN AN ORGANIZED HEALTH CARE EDUCATION/TRAINING PROGRAM

## 2022-06-08 PROCEDURE — 6360000002 HC RX W HCPCS: Performed by: NURSE ANESTHETIST, CERTIFIED REGISTERED

## 2022-06-08 PROCEDURE — 2709999900 HC NON-CHARGEABLE SUPPLY: Performed by: STUDENT IN AN ORGANIZED HEALTH CARE EDUCATION/TRAINING PROGRAM

## 2022-06-08 PROCEDURE — 88305 TISSUE EXAM BY PATHOLOGIST: CPT

## 2022-06-08 PROCEDURE — 7100000010 HC PHASE II RECOVERY - FIRST 15 MIN: Performed by: STUDENT IN AN ORGANIZED HEALTH CARE EDUCATION/TRAINING PROGRAM

## 2022-06-08 PROCEDURE — 45380 COLONOSCOPY AND BIOPSY: CPT | Performed by: STUDENT IN AN ORGANIZED HEALTH CARE EDUCATION/TRAINING PROGRAM

## 2022-06-08 PROCEDURE — 3609010300 HC COLONOSCOPY W/BIOPSY SINGLE/MULTIPLE: Performed by: STUDENT IN AN ORGANIZED HEALTH CARE EDUCATION/TRAINING PROGRAM

## 2022-06-08 RX ORDER — SODIUM CHLORIDE 0.9 % (FLUSH) 0.9 %
5-40 SYRINGE (ML) INJECTION PRN
Status: DISCONTINUED | OUTPATIENT
Start: 2022-06-08 | End: 2022-06-08 | Stop reason: HOSPADM

## 2022-06-08 RX ORDER — SODIUM CHLORIDE 9 MG/ML
INJECTION, SOLUTION INTRAVENOUS PRN
Status: DISCONTINUED | OUTPATIENT
Start: 2022-06-08 | End: 2022-06-08 | Stop reason: HOSPADM

## 2022-06-08 RX ORDER — SODIUM CHLORIDE 0.9 % (FLUSH) 0.9 %
5-40 SYRINGE (ML) INJECTION EVERY 12 HOURS SCHEDULED
Status: DISCONTINUED | OUTPATIENT
Start: 2022-06-08 | End: 2022-06-08 | Stop reason: HOSPADM

## 2022-06-08 RX ORDER — FENTANYL CITRATE 50 UG/ML
INJECTION, SOLUTION INTRAMUSCULAR; INTRAVENOUS PRN
Status: DISCONTINUED | OUTPATIENT
Start: 2022-06-08 | End: 2022-06-08 | Stop reason: SDUPTHER

## 2022-06-08 RX ORDER — PROPOFOL 10 MG/ML
INJECTION, EMULSION INTRAVENOUS PRN
Status: DISCONTINUED | OUTPATIENT
Start: 2022-06-08 | End: 2022-06-08 | Stop reason: SDUPTHER

## 2022-06-08 RX ORDER — ONDANSETRON 4 MG/1
4 TABLET, ORALLY DISINTEGRATING ORAL EVERY 8 HOURS PRN
Status: DISCONTINUED | OUTPATIENT
Start: 2022-06-08 | End: 2022-06-08 | Stop reason: HOSPADM

## 2022-06-08 RX ORDER — ONDANSETRON 2 MG/ML
4 INJECTION INTRAMUSCULAR; INTRAVENOUS EVERY 6 HOURS PRN
Status: DISCONTINUED | OUTPATIENT
Start: 2022-06-08 | End: 2022-06-08 | Stop reason: HOSPADM

## 2022-06-08 RX ORDER — SODIUM CHLORIDE 9 MG/ML
25 INJECTION, SOLUTION INTRAVENOUS PRN
Status: DISCONTINUED | OUTPATIENT
Start: 2022-06-08 | End: 2022-06-08 | Stop reason: HOSPADM

## 2022-06-08 RX ADMIN — PROPOFOL 700 MG: 10 INJECTION, EMULSION INTRAVENOUS at 16:49

## 2022-06-08 RX ADMIN — FENTANYL CITRATE 50 MCG: 50 INJECTION, SOLUTION INTRAMUSCULAR; INTRAVENOUS at 16:49

## 2022-06-08 RX ADMIN — SODIUM CHLORIDE: 9 INJECTION, SOLUTION INTRAVENOUS at 16:42

## 2022-06-08 RX ADMIN — FENTANYL CITRATE 50 MCG: 50 INJECTION, SOLUTION INTRAMUSCULAR; INTRAVENOUS at 16:45

## 2022-06-08 ASSESSMENT — PAIN - FUNCTIONAL ASSESSMENT: PAIN_FUNCTIONAL_ASSESSMENT: 0-10

## 2022-06-08 NOTE — H&P
History and Physical      ASSESSMENT AND PLAN:    21y/F w/ history of previously diagnosed UC in 2014 who responded to treatment with mesalamine and was eventually weaned completely off therapy and doing well with no symptoms now presents with rectal discomfort and bleeding concerning for reactivation of disease. PLAN:  Colonoscopy today        HISTORY OF PRESENT ILLNESS:      XAVI is a very pleasant 24year old female with a history of ulcerative colitis.     This young lady was diagnosed with ulcerative colitis in 2014. She is a previous patient of Dr. Dany Castillo. I will attempt copy of the past medical records. Last colonoscopy was in 2018. Initially was on Apriso but was able to wean off the medication and has done well until recently. The patient is having a toilet filled with blood with rectal burning along with intermittent left lower quad  pain. There is tenesmus but when the patient sits on the toilet, there is only a small amount of stool. No weight loss or fevers.       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 Past Medical History:        Diagnosis Date    Colitis      Past Surgical History:        Procedure Laterality Date    COLONOSCOPY       Social History:    TOBACCO:   reports that she has never smoked. She has never used smokeless tobacco.  ETOH:   reports current alcohol use. DRUGS:   reports no history of drug use. Family History:       Problem Relation Age of Onset    Thyroid Disease Father        No current facility-administered medications for this encounter.     Current Outpatient Medications:     polyethylene glycol-electrolytes (NULYTELY) 420 g solution, Take 1 L by mouth once, Disp: , Rfl:     mesalamine (LIALDA) 1.2 g EC tablet, Take 4 tablets by mouth daily (with breakfast), Disp: 120 tablet, Rfl: 5    hydrocortisone (ANUSOL-HC) 25 MG suppository, Place 1 suppository rectally every 12 hours, Disp: 28 suppository, Rfl: 5    Multiple Vitamins-Minerals (THERAPEUTIC MULTIVITAMIN-MINERALS) tablet, Take 1 tablet by mouth daily  (Patient not taking: Reported on 6/1/2022), Disp: , Rfl:     spironolactone (ALDACTONE) 25 MG tablet, Take 25 mg by mouth daily Indications: ACNE , Disp: , Rfl:     desogestrel-ethinyl estradiol (DESOGEN) 0.15-30 MG-MCG per tablet, Take 1 tablet by mouth daily, Disp: 1 packet, Rfl: 8     Allergies:  Patient has no known allergies. ROS:  General: Patient denies n/v/f/c or weight loss. HEENT: Patient denies persistent postnasal drip, scleral icterus, drooling, persistent bleeding from nose/mouth. Resp: Patient denies SOB, wheezing, productive cough. Cards: Patient denies CP, palpitations, significant edema  GI: As above. Derm: Patient denies jaundice/rashes. Musc: Patient denies diffuse/irregular joint swelling or myalgias. PHYSICAL EXAM:  Ht 5' 6\" (1.676 m)   Wt 125 lb (56.7 kg)   LMP 05/22/2022   BMI 20.18 kg/m²   Physical Exam:  General: Overall well-appearing, NAD  HEENT: PERRLA, EOMI, Anicteric sclera, MMM, no rhinorrhea  Cards: RRR, no LE edema  Resp: Breathing comfortably on room air, good air movement, no use of accessory muscles, no audible wheezing  Abdomen: soft, NT, ND. Extremities: Moves all extremities, no effusions or bruising.   Skin: No rashes or jaundice  Neuro: A&O x 3, CN grossly intact, non-focal exam            Electronically signed by Tarah Sibley MD on 6/8/2022 at 4:46 AM

## 2022-06-08 NOTE — ANESTHESIA PRE PROCEDURE
Department of Anesthesiology  Preprocedure Note       Name:  Tianna Oglesby   Age:  24 y.o.  :  2000                                          MRN:  [id number]         Date:  2022      Surgeon: Saira Yanez):  Smiley Case MD    Procedure: Procedure(s):  COLONOSCOPY DIAGNOSTIC    Medications prior to admission:   Prior to Admission medications    Medication Sig Start Date End Date Taking?  Authorizing Provider   polyethylene glycol-electrolytes (NULYTELY) 420 g solution Take 1 L by mouth once 22   Historical Provider, MD   mesalamine (LIALDA) 1.2 g EC tablet Take 4 tablets by mouth daily (with breakfast) 22   Smiley Case MD   hydrocortisone (ANUSOL-HC) 25 MG suppository Place 1 suppository rectally every 12 hours 22   Smiley Case MD   Multiple Vitamins-Minerals (THERAPEUTIC MULTIVITAMIN-MINERALS) tablet Take 1 tablet by mouth daily   Patient not taking: Reported on 2022    Historical Provider, MD   spironolactone (ALDACTONE) 25 MG tablet Take 25 mg by mouth daily Indications: ACNE  21   Historical Provider, MD   desogestrel-ethinyl estradiol (DESOGEN) 0.15-30 MG-MCG per tablet Take 1 tablet by mouth daily 22   Roxanne Brewer DO       Current medications:    Current Facility-Administered Medications   Medication Dose Route Frequency Provider Last Rate Last Admin    sodium chloride flush 0.9 % injection 5-40 mL  5-40 mL IntraVENous 2 times per day Smiley Case MD        sodium chloride flush 0.9 % injection 5-40 mL  5-40 mL IntraVENous PRN Smiley Case MD        0.9 % sodium chloride infusion  25 mL IntraVENous PRN Smiley Case MD           Allergies:  No Known Allergies    Problem List:    Patient Active Problem List   Diagnosis Code    Ulcerative colitis ([facility] [address] K51.90    Acne vulgaris L70.0       Past Medical History:        Diagnosis Date    Colitis        Past Surgical History:        Procedure Laterality Date    COLONOSCOPY         Social History:    Social History     Tobacco Use    Smoking status: Never Smoker    Smokeless tobacco: Never Used   Substance Use Topics    Alcohol use: Yes     Comment: socially                                Counseling given: Not Answered      Vital Signs (Current):   Vitals:    06/01/22 1121 06/08/22 1331   BP:  111/66   Pulse:  (!) 102   Resp:  20   Temp:  36.9 °C (98.4 °F)   TempSrc:  Temporal   SpO2:  99%   Weight: 125 lb (56.7 kg) 125 lb (56.7 kg)   Height: 5' 6\" (1.676 m) 5' 6\" (1.676 m)                                              BP Readings from Last 3 Encounters:   06/08/22 111/66   05/25/22 123/84   04/25/22 106/62       NPO Status: Time of last liquid consumption: 2300                        Time of last solid consumption: 2000                        Date of last liquid consumption: 06/07/22                        Date of last solid food consumption: 06/06/22    BMI:   Wt Readings from Last 3 Encounters:   06/08/22 125 lb (56.7 kg)   05/25/22 125 lb 9.6 oz (57 kg)   04/25/22 120 lb (54.4 kg)     Body mass index is 20.18 kg/m². CBC:   Lab Results   Component Value Date    WBC 4.9 10/21/2017    RBC 4.75 10/21/2017    HGB 14.4 10/21/2017    HCT 43.2 10/21/2017    MCV 90.9 10/21/2017    RDW 12.0 10/21/2017     10/21/2017       CMP:   Lab Results   Component Value Date     04/14/2022    K 4.2 04/14/2022     04/14/2022    CO2 25 04/14/2022    BUN 15 04/14/2022    CREATININE 0.9 04/14/2022    GFRAA >60 04/14/2022    LABGLOM >60 04/14/2022    GLUCOSE 82 04/14/2022    PROT 7.8 04/14/2022    CALCIUM 9.7 04/14/2022    BILITOT 0.4 04/14/2022    ALKPHOS 48 04/14/2022    AST 19 04/14/2022    ALT 8 04/14/2022       POC Tests: No results for input(s): POCGLU, POCNA, POCK, POCCL, POCBUN, POCHEMO, POCHCT in the last 72 hours.     Coags: No results found for: PROTIME, INR, APTT    HCG (If Applicable):   Lab Results   Component Value Date    PREGTESTUR negative 09/02/2021        ABGs: No results found for: PHART, PO2ART, WOH6VEX, FUD1KHM, BEART, C3PGXZAQ     Type & Screen (If Applicable):  No results found for: LABABO, LABRH    Drug/Infectious Status (If Applicable):  No results found for: HIV, HEPCAB    COVID-19 Screening (If Applicable): No results found for: COVID19        Anesthesia Evaluation  Patient summary reviewed and Nursing notes reviewed no history of anesthetic complications:   Airway: Mallampati: II  TM distance: >3 FB   Neck ROM: full  Mouth opening: > = 3 FB   Dental: normal exam     Comment: Denies loose chipped or missing teeth    Pulmonary:Negative Pulmonary ROS and normal exam                               Cardiovascular:Negative CV ROS  Exercise tolerance: good (>4 METS),            Beta Blocker:  Not on Beta Blocker         Neuro/Psych:   Negative Neuro/Psych ROS              GI/Hepatic/Renal:   (+) PUD,           Endo/Other: Negative Endo/Other ROS                    Abdominal:             Vascular: negative vascular ROS. Other Findings:           Anesthesia Plan      MAC     ASA 1       Induction: intravenous. Anesthetic plan and risks discussed with patient. Plan discussed with attending.                     PRABHAKAR Walker - CRNA   6/8/2022

## 2022-06-08 NOTE — OP NOTE
Operative Note      Patient: Ricco Bundy  YOB: 2000  MRN: [id number]    Date of Procedure: 6/8/2022    Pre-Op Diagnosis: ULCERATIVE COLITITS WITH COMPLICATION    Post-Op Diagnosis: Same. Procedure(s):  COLONOSCOPY WITH BIOPSY    Surgeon(s):  Bee Upton MD    Assistant:   * No surgical staff found *    Anesthesia: Monitor Anesthesia Care    Estimated Blood Loss (mL): Minimal    Complications: None    Specimens:   ID Type Source Tests Collected by Time Destination   A : terminal ileum biopsy Tissue Colon SURGICAL PATHOLOGY Bee Upton MD 6/8/2022 1710    B : right colon biopsy Tissue Colon SURGICAL PATHOLOGY Bee Upton MD 6/8/2022 1712    C : transverse colon biopsy Tissue Colon SURGICAL PATHOLOGY Bee Upton MD 6/8/2022 1714    D : descending colon biopsy Tissue Colon SURGICAL PATHOLOGY Bee Upton MD 6/8/2022 1716    E : sigmoid colon biopsy Tissue Colon SURGICAL PATHOLOGY Bee Upton MD 6/8/2022 1721    F : rectal biopsy Specimen Rectum SURGICAL PATHOLOGY Bee Upton MD 6/8/2022 1727        Implants:  * No implants in log *      Drains: * No LDAs found *    Indications:  21y/F w/ history of UC now off therapy who presents w/ symptoms suggesting reactivation of inflammation. Findings:   Normal terminal ileum. Biopsied. Normal cecum, ascending colon, transverse colon, descending colon, and sigmoid colon. Biopsied. Rectum with diffuse, active inflammation consistent with active Ulcerative Colitis. Biopsies obtained. No internal/external hemorrhoids or sejal-anal disease. Detailed Description of Procedure:   Pre-Anesthesia Assessment:  Prior to the procedure, a history and physical was performed and patient medications and allergies were reviewed. The risks, benefits, complications, treatment options and expected outcomes were discussed with the patient.   The possibilities of reaction to medication, pulmonary aspiration, perforation of the gastrointestinal tract, bleeding requiring transfusion or operation, respiratory failure requiring placement on a ventilator, and failure to diagnose a condition/identify polyps or lesions were discussed with the patient. All questions were answered and informed consent was obtained. Patient identification and proposed procedure were verified by the physician, the nurse, the anesthesiologist, the anesthetist, and the technician in the preprocedure area. Please see accompanying documentation. All staff in attendance for the performed procedure act in the role of the Chaperone. After I obtained informed consent, the scope was passed under direct vision. Throughout the procedure, the patient's blood pressure, pulse, and oxygen saturations were monitored continuously. The colonoscope was introduced through the anus and advanced to the terminal ileum. The procedure was performed without difficulty. The patient tolerated the procedure well. Colonoscopy:    Anticoagulants: None. Primary Family Member/s with Colon Cancer: None. The quality of the bowel preparation was adequate. The terminal ileum, the ileocecal valve, the appendiceal orifice, and the rectum via retroflex were photographed. Findings: On Perianal exam, no external hemorrhoids or sejal-anal disease was appreciated. The mucosa of the terminal ileum appeared normal. No inflammation or ulceration was appreciated. Biopsies obtained. The mucosa of the cecum and ascending colon appeared normal. No inflammation or ulceration was appreciated. Biopsies obtained. The mucosa of the transverse colon appeared normal. No inflammation or ulceration was appreciated. Biopsies obtained. The mucosa of the descending colon and sigmoid colon appeared normal. No inflammation or ulceration was appreciated. Biopsies obtained.      The mucosa of the entire rectum (from anal verge to 15cm) was noted to be actively inflamed with erythema, edema, granularity, friability, decreased vascular pattern, and superficial erosions and ulceration. Biopsies obtained. No internal rectal hemorrhoids were present on rectal retroflexion. Recommendations:  -The patient will be observed post procedure until all discharge criteria are met. -Patient has a contact number available for emergencies. The signs and symptoms of potential delayed complications were discussed with the patient.    -Return to normal activities tomorrow. -Written discharge instructions were provided to the patient.    -Await pathology results.  -Timeframe until next colonoscopy will be discussed in clinic and based on Clinical Guidelines regarding size, number, and pathology of polyps removed as well as adequacy of bowel prep.   -Clinic appointment scheduled for 7/6/22.       Electronically signed by Jovana Turcios MD on 6/8/2022 at 5:51 PM

## 2022-06-08 NOTE — ANESTHESIA POSTPROCEDURE EVALUATION
Department of Anesthesiology  Postprocedure Note    Patient: Ricco Bundy  MRN: [id number]  YOB: 2000  Date of evaluation: 6/8/2022  Time:  6:03 PM     Procedure Summary     Date: 06/08/22 Room / Location: [address] [place] [address] Court / [facility]    Anesthesia Start: 5990 Anesthesia Stop: 6789    Procedure: COLONOSCOPY WITH BIOPSY (N/A ) Diagnosis:       Mild chronic ulcerative colitis without complication ([address]      (ULCERATIVE COLITITS WITH COMPLICATION)    Surgeons: Balbina Zavaleta MD Responsible Provider: Henrietta Dutta MD    Anesthesia Type: MAC ASA Status: 1          Anesthesia Type: No value filed. Cuong Phase I: Cuong Score: 10    Cuong Phase II: Cuong Score: 10    Last vitals: Reviewed and per EMR flowsheets.        Anesthesia Post Evaluation    Patient location during evaluation: PACU  Patient participation: complete - patient participated  Level of consciousness: awake and alert  Airway patency: patent  Nausea & Vomiting: no nausea and no vomiting  Complications: no  Cardiovascular status: hemodynamically stable  Respiratory status: acceptable  Hydration status: euvolemic

## 2022-07-03 NOTE — PROGRESS NOTES
Gastroenterology Progress Note      SUBJECTIVE:      Ms. Clyde Escalante is a 21y/F who presents to clinic today in follow-up for Ulcerative Colitis. She was diagnosed with UC in 2014 and her last colonoscopy was in 2018. She was at one point on treatment w/ mesalamine but this was stopped when the patient was without symptoms. She is now having LLQ abdominal pain, tenesmus, and rectal bleeding concerning for a flare. She has been started on a steroid taper with mild improvement in symptoms. Labwork including inflammatory markers were recently obtained and negative. OBJECTIVE    Physical    VITALS:  /84   Pulse 78   Temp 98.2 °F (36.8 °C) (Temporal)   Resp 16   Ht 5' 6\" (1.676 m)   Wt 125 lb 9.6 oz (57 kg)   LMP 05/22/2022   SpO2 99%   BMI 20.27 kg/m²   Physical Exam:  General: Overall well-appearing, NAD  HEENT: PERRLA, EOMI, Anicteric sclera, MMM, no rhinorrhea  Cards: RRR, no LE edema  Resp: Breathing comfortably on room air, good air movement, no use of accessory muscles, no audible wheezing  Abdomen: soft, ND. Mild tenderness. Extremities: Moves all extremities, no effusions or bruising. Skin: No rashes or jaundice  Neuro: A&O x 3, CN grossly intact, non-focal exam    ASSESSMENT AND PLAN      21y/F w/ previously diagnosed UC now off therapy who presents w/ symptoms consistent with recurrent and active disease. PLAN:  -Continue current steroid taper  -I will schedule the patient for colonoscopy for re-staging.   -Further decisions will be made based on endoscopic findings. I will see the patient in clinic following her endoscopy. Thank you for including us in the care of this patient. Please do not hesitate to contact us with any additional questions or concerns.     Kurtis Skinner MD  Gastroenterology/Hepatology  Advanced Endoscopy

## 2022-07-06 ENCOUNTER — OFFICE VISIT (OUTPATIENT)
Dept: GASTROENTEROLOGY | Age: 22
End: 2022-07-06
Payer: COMMERCIAL

## 2022-07-06 VITALS — SYSTOLIC BLOOD PRESSURE: 121 MMHG | WEIGHT: 132 LBS | DIASTOLIC BLOOD PRESSURE: 87 MMHG | BODY MASS INDEX: 21.31 KG/M2

## 2022-07-06 DIAGNOSIS — K51.30 ULCERATIVE RECTOSIGMOIDITIS WITHOUT COMPLICATION (HCC): Primary | ICD-10-CM

## 2022-07-06 PROCEDURE — 99213 OFFICE O/P EST LOW 20 MIN: CPT | Performed by: STUDENT IN AN ORGANIZED HEALTH CARE EDUCATION/TRAINING PROGRAM

## 2022-07-06 RX ORDER — PREDNISONE 10 MG/1
10 TABLET ORAL DAILY
Qty: 14 TABLET | Refills: 0 | Status: SHIPPED | OUTPATIENT
Start: 2022-07-06 | End: 2022-07-16

## 2022-07-06 RX ORDER — PREDNISONE 1 MG/1
5 TABLET ORAL DAILY
Qty: 10 TABLET | Refills: 5 | Status: SHIPPED | OUTPATIENT
Start: 2022-07-06 | End: 2022-07-16

## 2022-07-06 RX ORDER — MESALAMINE 1000 MG/1
1000 SUPPOSITORY RECTAL NIGHTLY
Qty: 60 SUPPOSITORY | Refills: 5 | Status: SHIPPED | OUTPATIENT
Start: 2022-07-06

## 2022-07-06 NOTE — PROGRESS NOTES
Gastroenterology Outpatient Progress Note      SUBJECTIVE:      Ms. Ara Torres is a 21y/F who presents to clinic today in follow-up of her Ulcerative Colitis. She had a Colonoscopy on 6/8 for mucosal assessment and re-staging that showed:    Findings:   Normal terminal ileum. Biopsied. Normal cecum, ascending colon, transverse colon, descending colon, and sigmoid colon. Biopsied. Rectum with diffuse, active inflammation consistent with active Ulcerative Colitis. Biopsies obtained. No internal/external hemorrhoids or sejal-anal disease. Pathology:  Terminal Ileum: Normal. No inflammation or dysplasia. R Colon: Normal. No inflammation or dysplasia. Transverse Colon: Normal. No inflammation or dysplasia. Descending Colon: Normal. No inflammation or dysplasia. Sigmoid Colon: Normal. No inflammation or dysplasia. Rectum: Chronic active proctitis. Negative for dysplasia. The patient is currently only on a tapering dose of a prednisone taper for therapy. She has not begun using suppositories. She is on 15mg prednisone. She has had a significant improvement in her symptoms but is still seeing blood in her stool. OBJECTIVE    Physical    VITALS:  /87   Wt 132 lb (59.9 kg)   LMP 06/19/2022   BMI 21.31 kg/m²   Physical Exam:  General: Overall well-appearing, NAD  HEENT: PERRLA, EOMI, Anicteric sclera, MMM, no rhinorrhea  Cards: RRR, no LE edema  Resp: Breathing comfortably on room air, good air movement, no use of accessory muscles, no audible wheezing  Abdomen: soft, NT, ND. Extremities: Moves all extremities, no effusions or bruising. Skin: No rashes or jaundice  Neuro: A&O x 3, CN grossly intact, non-focal exam     ASSESSMENT AND PLAN      21y/F w/ Ulcerative Colitis presents in follow-up.  Recent colonoscopy 6/8 showed active inflammation in the rectum only with otherwise normal exam.     PLAN:  -The patient will continue her current prednisone taper to completion.  -She will begin mesalamine suppositories nightly for maintenance therapy.   -If not improvement in symptoms with suppository therapy alone, we will add PO mesalamine. I will see the patient again in clinic in 1 month. Thank you for including us in the care of this patient. Please do not hesitate to contact us with any additional questions or concerns.     Malcom Leigh MD  Gastroenterology/Hepatology  Advanced Endoscopy

## 2022-08-08 ENCOUNTER — NURSE ONLY (OUTPATIENT)
Dept: PRIMARY CARE CLINIC | Age: 22
End: 2022-08-08

## 2022-08-11 ENCOUNTER — NURSE ONLY (OUTPATIENT)
Dept: PRIMARY CARE CLINIC | Age: 22
End: 2022-08-11

## 2022-08-11 DIAGNOSIS — Z11.1 ENCOUNTER FOR PPD SKIN TEST READING: Primary | ICD-10-CM

## 2022-09-09 ENCOUNTER — TELEPHONE (OUTPATIENT)
Dept: GASTROENTEROLOGY | Age: 22
End: 2022-09-09

## 2022-09-09 NOTE — TELEPHONE ENCOUNTER
Left message stating that her appointment scheduled with dr Sarah Palma will be cancelled and that if she has any questions to please call the office gave her the option to schedule with [facility]

## 2022-10-03 ENCOUNTER — NURSE ONLY (OUTPATIENT)
Dept: PRIMARY CARE CLINIC | Age: 22
End: 2022-10-03

## 2022-10-03 DIAGNOSIS — Z23 NEEDS FLU SHOT: Primary | ICD-10-CM

## 2022-10-03 PROCEDURE — 90674 CCIIV4 VAC NO PRSV 0.5 ML IM: CPT | Performed by: FAMILY MEDICINE

## 2022-10-03 PROCEDURE — 90471 IMMUNIZATION ADMIN: CPT | Performed by: FAMILY MEDICINE

## 2022-10-26 ENCOUNTER — OFFICE VISIT (OUTPATIENT)
Dept: GASTROENTEROLOGY | Age: 22
End: 2022-10-26
Payer: COMMERCIAL

## 2022-10-26 VITALS
HEART RATE: 73 BPM | OXYGEN SATURATION: 98 % | BODY MASS INDEX: 20.82 KG/M2 | WEIGHT: 129 LBS | DIASTOLIC BLOOD PRESSURE: 85 MMHG | SYSTOLIC BLOOD PRESSURE: 122 MMHG

## 2022-10-26 DIAGNOSIS — K51.20 ULCERATIVE PROCTITIS WITHOUT COMPLICATION (HCC): Primary | ICD-10-CM

## 2022-10-26 PROCEDURE — 99213 OFFICE O/P EST LOW 20 MIN: CPT | Performed by: STUDENT IN AN ORGANIZED HEALTH CARE EDUCATION/TRAINING PROGRAM

## 2022-10-26 RX ORDER — SODIUM PHOSPHATE, DIBASIC AND SODIUM PHOSPHATE, MONOBASIC 7; 19 G/133ML; G/133ML
1 ENEMA RECTAL
Refills: 0 | COMMUNITY
Start: 2022-10-26 | End: 2022-10-26

## 2022-10-26 NOTE — PROGRESS NOTES
[facility] ([facility])  Gastroenterology, Hepatology &  Advanced Endoscopy     Progress Note    SUBJECTIVE:      Horace Doran is a 22y/F w/ Ulcerative Proctitis who presents to clinic today in follow-up. She is currently maintained on mesalamine therapy. She denies seeing blood in her stool which overall has normalized regarding consistency. She reports sometimes seeing tissue-like debris in her stool. She reports dark stools at times but denies melena. She denies abdominal pain. She denies extra-intestinal manifestations of IBD. OBJECTIVE      Physical    VITALS:  /85   Pulse 73   Wt 129 lb (58.5 kg)   SpO2 98%   BMI 20.82 kg/m²   Physical Exam:  General: Overall well-appearing, NAD  HEENT: PERRLA, EOMI, Anicteric sclera, MMM, no rhinorrhea  Cards: RRR, no LE edema  Resp: Breathing comfortably on room air, good air movement, no use of accessory muscles, no audible wheezing  Abdomen: soft, NT, ND. Extremities: Moves all extremities, no effusions or bruising. Skin: No rashes or jaundice  Neuro: A&O x 3, CN grossly intact, non-focal exam       ASSESSMENT AND PLAN      22y/F w/ Ulcerative Proctitis maintained on mesalamine therapy presents to clinic in follow-up. PLAN:  - Continue mesalamine 4.8g daily. - I will plan on Flexible Sigmoidoscopy in 6 months from the start of therapy. - I will obtain labwork today. I will see the patient back in clinic following her Flex Sig. Thank you for including us in the care of this patient. Please do not hesitate to contact us with any additional questions or concerns.     Tiana Carrizales MD  Gastroenterology/Hepatology  Advanced Endoscopy

## 2022-10-27 ENCOUNTER — NURSE ONLY (OUTPATIENT)
Dept: PRIMARY CARE CLINIC | Age: 22
End: 2022-10-27

## 2022-10-27 DIAGNOSIS — K51.20 ULCERATIVE PROCTITIS WITHOUT COMPLICATION (HCC): ICD-10-CM

## 2022-10-27 LAB
BASOPHILS ABSOLUTE: 0.02 E9/L (ref 0–0.2)
BASOPHILS RELATIVE PERCENT: 0.6 % (ref 0–2)
C-REACTIVE PROTEIN: 0.3 MG/DL (ref 0–0.4)
EOSINOPHILS ABSOLUTE: 0.02 E9/L (ref 0.05–0.5)
EOSINOPHILS RELATIVE PERCENT: 0.6 % (ref 0–6)
HCT VFR BLD CALC: 45.6 % (ref 34–48)
HEMOGLOBIN: 15.1 G/DL (ref 11.5–15.5)
IMMATURE GRANULOCYTES #: 0 E9/L
IMMATURE GRANULOCYTES %: 0 % (ref 0–5)
LYMPHOCYTES ABSOLUTE: 1.47 E9/L (ref 1.5–4)
LYMPHOCYTES RELATIVE PERCENT: 42.4 % (ref 20–42)
MCH RBC QN AUTO: 29.2 PG (ref 26–35)
MCHC RBC AUTO-ENTMCNC: 33.1 % (ref 32–34.5)
MCV RBC AUTO: 88.2 FL (ref 80–99.9)
MONOCYTES ABSOLUTE: 0.3 E9/L (ref 0.1–0.95)
MONOCYTES RELATIVE PERCENT: 8.6 % (ref 2–12)
NEUTROPHILS ABSOLUTE: 1.66 E9/L (ref 1.8–7.3)
NEUTROPHILS RELATIVE PERCENT: 47.8 % (ref 43–80)
PDW BLD-RTO: 12.2 FL (ref 11.5–15)
PLATELET # BLD: 244 E9/L (ref 130–450)
PMV BLD AUTO: 11.4 FL (ref 7–12)
RBC # BLD: 5.17 E12/L (ref 3.5–5.5)
SEDIMENTATION RATE, ERYTHROCYTE: 2 MM/HR (ref 0–20)
WBC # BLD: 3.5 E9/L (ref 4.5–11.5)

## 2022-11-02 ENCOUNTER — TELEPHONE (OUTPATIENT)
Dept: GASTROENTEROLOGY | Age: 22
End: 2022-11-02

## 2022-11-02 ENCOUNTER — PREP FOR PROCEDURE (OUTPATIENT)
Dept: GASTROENTEROLOGY | Age: 22
End: 2022-11-02

## 2022-11-02 NOTE — TELEPHONE ENCOUNTER
Prior Authorization Form:      DEMOGRAPHICS:                     Patient Name:  Noa Carrero  Patient :  2000            Insurance:  Payor: [employer] / Plan: [address] [place] [address] / Product Type: *No Product type* /   Insurance ID Number:    Payer/Plan Subscr  Sex Relation Sub. Ins. ID Effective Group Num   1.  [place] Cris  1964 Male Child [id number]                                    [id number]         DIAGNOSIS & PROCEDURE:                       Procedure/Operation: Flex Sigmoidoscopy           CPT Code: 42033    Diagnosis:  Ulcerative Colitis    ICD10 Code: K51.90    Location:  [facility]    Surgeon:  Dr. Chandler March     SCHEDULING INFORMATION:                          Date: 2023    Time: 0800              Anesthesia:  MAC/TIVA                                                       Status:  Outpatient            Electronically signed by Igor Coronado MA on 2022 at 9:36 AM

## 2023-01-09 RX ORDER — MESALAMINE 1.2 G/1
TABLET, DELAYED RELEASE ORAL
Qty: 120 TABLET | Refills: 5 | Status: SHIPPED | OUTPATIENT
Start: 2023-01-09

## 2023-01-12 ENCOUNTER — OFFICE VISIT (OUTPATIENT)
Dept: PRIMARY CARE CLINIC | Age: 23
End: 2023-01-12

## 2023-01-12 VITALS
WEIGHT: 129 LBS | OXYGEN SATURATION: 99 % | TEMPERATURE: 98.1 F | HEIGHT: 66 IN | HEART RATE: 74 BPM | SYSTOLIC BLOOD PRESSURE: 109 MMHG | RESPIRATION RATE: 16 BRPM | DIASTOLIC BLOOD PRESSURE: 77 MMHG | BODY MASS INDEX: 20.73 KG/M2

## 2023-01-12 DIAGNOSIS — J06.9 VIRAL URI WITH COUGH: Primary | ICD-10-CM

## 2023-01-12 DIAGNOSIS — J02.9 SORE THROAT: ICD-10-CM

## 2023-01-12 LAB — S PYO AG THROAT QL: NORMAL

## 2023-01-12 PROCEDURE — 99213 OFFICE O/P EST LOW 20 MIN: CPT | Performed by: NURSE PRACTITIONER

## 2023-01-12 PROCEDURE — 87880 STREP A ASSAY W/OPTIC: CPT | Performed by: NURSE PRACTITIONER

## 2023-01-12 RX ORDER — BENZONATATE 100 MG/1
100 CAPSULE ORAL 3 TIMES DAILY PRN
Qty: 21 CAPSULE | Refills: 0 | Status: SHIPPED | OUTPATIENT
Start: 2023-01-12 | End: 2023-01-19

## 2023-01-12 RX ORDER — METHYLPREDNISOLONE 4 MG/1
TABLET ORAL
Qty: 1 KIT | Refills: 0 | Status: SHIPPED | OUTPATIENT
Start: 2023-01-12

## 2023-01-12 NOTE — PROGRESS NOTES
Chief Complaint:   Pharyngitis (Dry cough at night, ear pressure ongoing for 7 days)    History of Present Illness   Source of history provided by:  patient. Shawanda Gore is a 20 y.o. old female who presents to walk-in for sore throat. Pt states sore throat began 7 days ago. Reports associated dry cough and ear pressure. Denies any fever, chills, nausea, vomiting, abdominal pain, CP, SOB, cough, or lethargy. She does work in the hospital around illness. Exposed To: Streptococcus: no.                             Infectious Mononucleosis: no.      COVID-19: no.    Review of Systems   Unless otherwise stated in this report or unable to obtain because of the patient's clinical or mental status as evidenced by the medical record, this patients's positive and negative responses for Review of Systems, constitutional, psych, eyes, ENT, cardiovascular, respiratory, gastrointestinal, neurological, genitourinary, musculoskeletal, integument systems and systems related to the presenting problem are either stated in the preceding or were not pertinent or were negative for the symptoms and/or complaints related to the medical problem. Past Medical History:  has a past medical history of Colitis. Past Surgical History:  has a past surgical history that includes Colonoscopy and Colonoscopy (N/A, 6/8/2022). Social History:  reports that she has never smoked. She has never used smokeless tobacco. She reports current alcohol use. She reports that she does not use drugs. Family History: family history includes Thyroid Disease in her father. Allergies: Patient has no known allergies. Physical Exam   Vital Signs:  /77   Pulse 74   Temp 98.1 °F (36.7 °C) (Oral)   Resp 16   Ht 5' 6\" (1.676 m)   Wt 129 lb (58.5 kg)   LMP 12/29/2022   SpO2 99%   BMI 20.82 kg/m²    Oxygen Saturation Interpretation: Normal.    Constitutional:  Alert, development consistent with age.   Ears:  TMs without perforation, injection, or bulging. External canals clear without exudate. Throat: Airway patent. Posterior pharynx with erythema and no tonsillar hypertrophy. There is no exudate noted. Neck:  Supple with good ROM. There is no anterior bilateral adenopathy. Lungs:  Clear to auscultation and breath sounds equal.    CV: Regular rate and rhythm, normal heart sounds, without pathological murmurs, ectopy, gallops, or rubs. Abdomen:  Soft, nontender, good bowel sounds. No firm or pulsatile mass. No hepatosplenomegaly. Skin:  No rashes, erythema present. Lymphatics: No lymphangitis or adenopathy noted other then stated above. Neurological:  Alert and orientated. Motor functions intact. Responds to commands. Test Results Section   (All laboratory and radiology results have been personally reviewed by myself)  Labs:  Results for orders placed or performed in visit on 01/12/23   POCT rapid strep A   Result Value Ref Range    Strep A Ag None Detected None Detected     Imaging: All Radiology results interpreted by Radiologist unless otherwise noted. No results found. Assessment / Plan   Impression(s):  Israel Kulkarni was seen today for pharyngitis. Diagnoses and all orders for this visit:    Viral URI with cough  -     methylPREDNISolone (MEDROL DOSEPACK) 4 MG tablet; Take by mouth. -     benzonatate (TESSALON) 100 MG capsule; Take 1 capsule by mouth 3 times daily as needed for Cough    Sore throat  -     POCT rapid strep A    Rapid strep came back negative. Pt advised that illness is likely viral and should resolve with time and conservative measures. Script written for Medrol dosepack and Benzonatate, side effects discussed. Increase fluids and rest. NSAIDs prn pain/fever. F/u PCP in 5-7 days if symptoms persist. ED sooner if symptoms worsen or change.  ED immediately with the development of refractory fever, shaking chills, dyspnea, dysphagia, neck stiffness, vomiting, etc. Pt is in agreement with this care plan. All questions answered. Return if symptoms worsen or fail to improve. Electronically signed by PRABHAKAR Arora CNP   DD: 1/12/23    **This report was transcribed using voice recognition software. Every effort was made to ensure accuracy; however, inadvertent computerized transcription errors may be present.

## 2023-03-27 DIAGNOSIS — K51.20 ULCERATIVE PROCTITIS WITHOUT COMPLICATION (HCC): Primary | ICD-10-CM

## 2023-03-27 RX ORDER — SODIUM PHOSPHATE, DIBASIC AND SODIUM PHOSPHATE, MONOBASIC 7; 19 G/133ML; G/133ML
1 ENEMA RECTAL 2 TIMES DAILY
Qty: 2 EACH | Refills: 0 | COMMUNITY
Start: 2023-03-27

## 2023-03-31 ENCOUNTER — ANESTHESIA (OUTPATIENT)
Dept: ENDOSCOPY | Age: 23
End: 2023-03-31

## 2023-03-31 ENCOUNTER — HOSPITAL ENCOUNTER (OUTPATIENT)
Age: 23
Setting detail: OUTPATIENT SURGERY
Discharge: HOME | End: 2023-03-31
Attending: STUDENT IN AN ORGANIZED HEALTH CARE EDUCATION/TRAINING PROGRAM | Admitting: STUDENT IN AN ORGANIZED HEALTH CARE EDUCATION/TRAINING PROGRAM
Payer: COMMERCIAL

## 2023-03-31 ENCOUNTER — ANESTHESIA EVENT (OUTPATIENT)
Dept: ENDOSCOPY | Age: 23
End: 2023-03-31

## 2023-03-31 VITALS
HEIGHT: 66 IN | SYSTOLIC BLOOD PRESSURE: 110 MMHG | RESPIRATION RATE: 20 BRPM | OXYGEN SATURATION: 97 % | BODY MASS INDEX: 19.44 KG/M2 | DIASTOLIC BLOOD PRESSURE: 78 MMHG | WEIGHT: 121 LBS | TEMPERATURE: 97.6 F | HEART RATE: 70 BPM

## 2023-03-31 DIAGNOSIS — K51.90 ULCERATIVE COLITIS (HCC): ICD-10-CM

## 2023-03-31 DIAGNOSIS — Z01.812 PRE-OPERATIVE LABORATORY EXAMINATION: Primary | ICD-10-CM

## 2023-03-31 LAB
HCG, URINE, POC: NEGATIVE
Lab: NORMAL
NEGATIVE QC PASS/FAIL: NORMAL
POSITIVE QC PASS/FAIL: NORMAL

## 2023-03-31 PROCEDURE — 7100000011 HC PHASE II RECOVERY - ADDTL 15 MIN: Performed by: STUDENT IN AN ORGANIZED HEALTH CARE EDUCATION/TRAINING PROGRAM

## 2023-03-31 PROCEDURE — 7100000010 HC PHASE II RECOVERY - FIRST 15 MIN: Performed by: STUDENT IN AN ORGANIZED HEALTH CARE EDUCATION/TRAINING PROGRAM

## 2023-03-31 PROCEDURE — 3609010300 HC COLONOSCOPY W/BIOPSY SINGLE/MULTIPLE: Performed by: STUDENT IN AN ORGANIZED HEALTH CARE EDUCATION/TRAINING PROGRAM

## 2023-03-31 PROCEDURE — 45331 SIGMOIDOSCOPY AND BIOPSY: CPT | Performed by: STUDENT IN AN ORGANIZED HEALTH CARE EDUCATION/TRAINING PROGRAM

## 2023-03-31 PROCEDURE — 2709999900 HC NON-CHARGEABLE SUPPLY: Performed by: STUDENT IN AN ORGANIZED HEALTH CARE EDUCATION/TRAINING PROGRAM

## 2023-03-31 PROCEDURE — 3700000000 HC ANESTHESIA ATTENDED CARE: Performed by: STUDENT IN AN ORGANIZED HEALTH CARE EDUCATION/TRAINING PROGRAM

## 2023-03-31 PROCEDURE — 88305 TISSUE EXAM BY PATHOLOGIST: CPT

## 2023-03-31 PROCEDURE — 3700000001 HC ADD 15 MINUTES (ANESTHESIA): Performed by: STUDENT IN AN ORGANIZED HEALTH CARE EDUCATION/TRAINING PROGRAM

## 2023-03-31 RX ORDER — SODIUM CHLORIDE 0.9 % (FLUSH) 0.9 %
5-40 SYRINGE (ML) INJECTION PRN
Status: CANCELLED | OUTPATIENT
Start: 2023-03-31

## 2023-03-31 RX ORDER — ONDANSETRON 4 MG/1
4 TABLET, ORALLY DISINTEGRATING ORAL EVERY 8 HOURS PRN
Status: CANCELLED | OUTPATIENT
Start: 2023-03-31

## 2023-03-31 RX ORDER — SODIUM CHLORIDE 9 MG/ML
INJECTION, SOLUTION INTRAVENOUS CONTINUOUS PRN
Status: DISCONTINUED | OUTPATIENT
Start: 2023-03-31 | End: 2023-03-31 | Stop reason: SDUPTHER

## 2023-03-31 RX ORDER — PROPOFOL 10 MG/ML
INJECTION, EMULSION INTRAVENOUS PRN
Status: DISCONTINUED | OUTPATIENT
Start: 2023-03-31 | End: 2023-03-31 | Stop reason: SDUPTHER

## 2023-03-31 RX ORDER — SODIUM CHLORIDE 0.9 % (FLUSH) 0.9 %
5-40 SYRINGE (ML) INJECTION EVERY 12 HOURS SCHEDULED
Status: CANCELLED | OUTPATIENT
Start: 2023-03-31

## 2023-03-31 RX ORDER — SODIUM CHLORIDE 9 MG/ML
INJECTION, SOLUTION INTRAVENOUS PRN
Status: CANCELLED | OUTPATIENT
Start: 2023-03-31

## 2023-03-31 RX ORDER — ONDANSETRON 2 MG/ML
4 INJECTION INTRAMUSCULAR; INTRAVENOUS EVERY 6 HOURS PRN
Status: CANCELLED | OUTPATIENT
Start: 2023-03-31

## 2023-03-31 RX ADMIN — SODIUM CHLORIDE: 9 INJECTION, SOLUTION INTRAVENOUS at 08:41

## 2023-03-31 RX ADMIN — PROPOFOL 150 MG: 10 INJECTION, EMULSION INTRAVENOUS at 08:56

## 2023-03-31 RX ADMIN — PROPOFOL 50 MG: 10 INJECTION, EMULSION INTRAVENOUS at 08:57

## 2023-03-31 RX ADMIN — PROPOFOL 100 MG: 10 INJECTION, EMULSION INTRAVENOUS at 08:59

## 2023-03-31 ASSESSMENT — PAIN SCALES - GENERAL
PAINLEVEL_OUTOF10: 0

## 2023-03-31 ASSESSMENT — PAIN - FUNCTIONAL ASSESSMENT: PAIN_FUNCTIONAL_ASSESSMENT: 0-10

## 2023-03-31 NOTE — ANESTHESIA POSTPROCEDURE EVALUATION
Department of Anesthesiology  Postprocedure Note    Patient: Miriam Wallace  MRN: [id number]  YOB: 2000  Date of evaluation: 3/31/2023      Procedure Summary     Date: 03/31/23 Room / Location: [address] [place] [address] / [facility]    Anesthesia Start: 7257 Anesthesia Stop: 7585    Procedure: COLONOSCOPY WITH BIOPSY Diagnosis:       Ulcerative colitis ([facility] [address]      (Ulcerative colitis ([facility] [address] [K51.90])    Surgeons: Be Archuleta MD Responsible Provider: Delano Solis MD    Anesthesia Type: MAC ASA Status: 2          Anesthesia Type: No value filed. Cuong Phase I: Cuong Score: 10    Cuong Phase II:        Anesthesia Post Evaluation    Patient location during evaluation: PACU  Patient participation: complete - patient participated  Level of consciousness: awake and alert  Airway patency: patent  Nausea & Vomiting: no nausea and no vomiting  Complications: no  Cardiovascular status: hemodynamically stable  Respiratory status: acceptable  Hydration status: euvolemic  There was medical reason for not using a multimodal analgesia pain management approach.

## 2023-03-31 NOTE — ANESTHESIA PRE PROCEDURE
Department of Anesthesiology  Preprocedure Note       Name:  Brooke Leblanc   Age:  20 y.o.  :  2000                                          MRN:  [id number]         Date:  3/31/2023      Surgeon: Álvaro Barba):  Minor Fernandez MD    Procedure: Procedure(s):  SIGMOIDOSCOPY BIOPSY FLEXIBLE    Medications prior to admission:   Prior to Admission medications    Medication Sig Start Date End Date Taking? Authorizing Provider   sodium phosphate (FLEET) 7-19 GM/118ML Place 1 enema rectally in the morning and at bedtime Place 1 enema twice as directed for prep for procedure 3/27/23   PRABHAKAR Ugarte CNP   mesalamine (LIALDA) 1.2 g EC tablet TAKE 4 TABLETS BY MOUTH DAILY WITH BREAKFAST 23   PRABHAKAR Ugarte CNP   mesalamine (CANASA) 1000 MG suppository Place 1 suppository rectally nightly 22   Minor Fernandez MD   PREDNISONE PO Take by mouth  Patient not taking: Reported on 10/26/2022    Historical Provider, MD   desogestrel-ethinyl estradiol (DESOGEN) 0.15-30 MG-MCG per tablet Take 1 tablet by mouth daily 22   Shelly Brewster DO   polyethylene glycol-electrolytes (NULYTELY) 420 g solution Take 1 L by mouth once  Patient not taking: No sig reported 22   Historical Provider, MD   hydrocortisone (ANUSOL-HC) 25 MG suppository Place 1 suppository rectally every 12 hours  Patient not taking: Reported on 10/26/2022 5/25/22   Minor Fernandez MD   spironolactone (ALDACTONE) 25 MG tablet Take 25 mg by mouth daily Indications: ACNE  21   Historical Provider, MD       Current medications:    No current outpatient medications on file. No current facility-administered medications for this visit.        Allergies:  No Known Allergies    Problem List:    Patient Active Problem List   Diagnosis Code    Ulcerative colitis ([facility] [address] K51.90    Acne vulgaris L70.0       Past Medical History:        Diagnosis Date    Colitis        Past Surgical History:        Procedure Laterality Date   

## 2023-04-17 ENCOUNTER — OFFICE VISIT (OUTPATIENT)
Dept: GASTROENTEROLOGY | Age: 23
End: 2023-04-17
Payer: COMMERCIAL

## 2023-04-17 VITALS
WEIGHT: 125.6 LBS | HEIGHT: 66 IN | RESPIRATION RATE: 18 BRPM | BODY MASS INDEX: 20.18 KG/M2 | DIASTOLIC BLOOD PRESSURE: 78 MMHG | SYSTOLIC BLOOD PRESSURE: 110 MMHG

## 2023-04-17 DIAGNOSIS — K51.30 ULCERATIVE RECTOSIGMOIDITIS WITHOUT COMPLICATION (HCC): Primary | ICD-10-CM

## 2023-04-17 PROCEDURE — 99213 OFFICE O/P EST LOW 20 MIN: CPT | Performed by: STUDENT IN AN ORGANIZED HEALTH CARE EDUCATION/TRAINING PROGRAM

## 2023-05-05 ENCOUNTER — OFFICE VISIT (OUTPATIENT)
Dept: FAMILY MEDICINE CLINIC | Age: 23
End: 2023-05-05

## 2023-05-05 VITALS
WEIGHT: 126.8 LBS | OXYGEN SATURATION: 97 % | BODY MASS INDEX: 20.38 KG/M2 | TEMPERATURE: 98.8 F | HEIGHT: 66 IN | SYSTOLIC BLOOD PRESSURE: 100 MMHG | HEART RATE: 93 BPM | DIASTOLIC BLOOD PRESSURE: 80 MMHG

## 2023-05-05 DIAGNOSIS — J06.9 UPPER RESPIRATORY TRACT INFECTION, UNSPECIFIED TYPE: Primary | ICD-10-CM

## 2023-05-05 DIAGNOSIS — J02.9 SORE THROAT: ICD-10-CM

## 2023-05-05 LAB
Lab: NORMAL
PERFORMING INSTRUMENT: NORMAL
QC PASS/FAIL: NORMAL
S PYO AG THROAT QL: NORMAL
SARS-COV-2, POC: NORMAL

## 2023-05-05 RX ORDER — AMOXICILLIN 875 MG/1
875 TABLET, COATED ORAL 2 TIMES DAILY
Qty: 20 TABLET | Refills: 0 | Status: SHIPPED | OUTPATIENT
Start: 2023-05-05 | End: 2023-05-15

## 2023-05-05 RX ORDER — TRIAMCINOLONE ACETONIDE 40 MG/ML
40 INJECTION, SUSPENSION INTRA-ARTICULAR; INTRAMUSCULAR ONCE
Status: COMPLETED | OUTPATIENT
Start: 2023-05-05 | End: 2023-05-05

## 2023-05-05 RX ADMIN — TRIAMCINOLONE ACETONIDE 40 MG: 40 INJECTION, SUSPENSION INTRA-ARTICULAR; INTRAMUSCULAR at 09:37

## 2023-05-05 SDOH — ECONOMIC STABILITY: HOUSING INSECURITY
IN THE LAST 12 MONTHS, WAS THERE A TIME WHEN YOU DID NOT HAVE A STEADY PLACE TO SLEEP OR SLEPT IN A SHELTER (INCLUDING NOW)?: NO

## 2023-05-05 SDOH — ECONOMIC STABILITY: FOOD INSECURITY: WITHIN THE PAST 12 MONTHS, YOU WORRIED THAT YOUR FOOD WOULD RUN OUT BEFORE YOU GOT MONEY TO BUY MORE.: NEVER TRUE

## 2023-05-05 SDOH — ECONOMIC STABILITY: INCOME INSECURITY: HOW HARD IS IT FOR YOU TO PAY FOR THE VERY BASICS LIKE FOOD, HOUSING, MEDICAL CARE, AND HEATING?: NOT HARD AT ALL

## 2023-05-05 SDOH — ECONOMIC STABILITY: FOOD INSECURITY: WITHIN THE PAST 12 MONTHS, THE FOOD YOU BOUGHT JUST DIDN'T LAST AND YOU DIDN'T HAVE MONEY TO GET MORE.: NEVER TRUE

## 2023-05-05 ASSESSMENT — PATIENT HEALTH QUESTIONNAIRE - PHQ9
SUM OF ALL RESPONSES TO PHQ QUESTIONS 1-9: 0
SUM OF ALL RESPONSES TO PHQ9 QUESTIONS 1 & 2: 0
SUM OF ALL RESPONSES TO PHQ QUESTIONS 1-9: 0
2. FEELING DOWN, DEPRESSED OR HOPELESS: 0
1. LITTLE INTEREST OR PLEASURE IN DOING THINGS: 0

## 2023-05-05 NOTE — PROGRESS NOTES
Chief Complaint       Pharyngitis (5 x days), Congestion (1 x day), and Headache    History of Present Illness   Source of history provided by:  patient. Ev Francisco is a 20 y.o. old female presenting to the walk in clinic for evaluation of nasal congestion, nasal drainage, headache, and sore throat x 5 days. Has been taking Mucinex OTC without relief. Denies any fever, chills, wheezing, CP, SOB, or GI symptoms. Denies any hx of asthma, COPD, or tobacco use. Pt has been vaccinated for COVID-19. ROS    Unless otherwise stated in this report or unable to obtain because of the patient's clinical or mental status as evidenced by the medical record, this patients's positive and negative responses for Review of Systems, constitutional, psych, eyes, ENT, cardiovascular, respiratory, gastrointestinal, neurological, genitourinary, musculoskeletal, integument systems and systems related to the presenting problem are either stated in the preceding or were not pertinent or were negative for the symptoms and/or complaints related to the medical problem. Physical Exam         VS:  /80   Pulse 93   Temp 98.8 °F (37.1 °C)   Ht 5' 6\" (1.676 m)   Wt 126 lb 12.8 oz (57.5 kg)   LMP 04/20/2023 (Approximate)   SpO2 97%   BMI 20.47 kg/m²    Oxygen Saturation Interpretation: Normal.    Constitutional:  Alert, development consistent with age. Ears:  External Ears: Bilateral pinna normal. TMs translucent without erythema or perforation bilaterally. Canals normal bilaterally without swelling or exudate  Nose:  Mild congestion of the nasal mucosa. There is no injection to middle turbinates bilaterally. Throat: Mild posterior pharyngeal erythema with mild post nasal drip present. No exudate or tonsillar hypertrophy noted. Neck:  Supple. There is anterior cervical adenopathy.   Lungs: CTAB without wheezes, rales, or rhonchi  Heart:  Regular rate and rhythm, normal heart sounds, without pathological

## 2023-07-07 RX ORDER — MESALAMINE 1000 MG/1
SUPPOSITORY RECTAL
Qty: 60 SUPPOSITORY | Refills: 5 | Status: SHIPPED | OUTPATIENT
Start: 2023-07-07

## 2023-09-25 ENCOUNTER — OFFICE VISIT (OUTPATIENT)
Dept: FAMILY MEDICINE CLINIC | Age: 23
End: 2023-09-25
Payer: COMMERCIAL

## 2023-09-25 VITALS
OXYGEN SATURATION: 98 % | HEART RATE: 82 BPM | HEIGHT: 66 IN | RESPIRATION RATE: 16 BRPM | SYSTOLIC BLOOD PRESSURE: 104 MMHG | WEIGHT: 125 LBS | DIASTOLIC BLOOD PRESSURE: 72 MMHG | TEMPERATURE: 98 F | BODY MASS INDEX: 20.09 KG/M2

## 2023-09-25 DIAGNOSIS — J32.0 MAXILLARY SINUSITIS, UNSPECIFIED CHRONICITY: Primary | ICD-10-CM

## 2023-09-25 PROCEDURE — 99213 OFFICE O/P EST LOW 20 MIN: CPT | Performed by: NURSE PRACTITIONER

## 2023-09-25 RX ORDER — DOXYCYCLINE HYCLATE 100 MG/1
100 CAPSULE ORAL 2 TIMES DAILY
Qty: 20 CAPSULE | Refills: 0 | Status: SHIPPED | OUTPATIENT
Start: 2023-09-25 | End: 2023-10-05

## 2023-09-25 NOTE — PROGRESS NOTES
23  Darrion Ivey : 2000 Sex: female  Age 24 y.o. Subjective:  Chief Complaint   Patient presents with    Facial Swelling     Nose feeling red and inflammed for 1 month feels there are bumps in there as well       HPI:   Darrion Ivey , 24 y.o. female presents to the clinic for evaluation of intermittent sinus drainage x 3 months. The patient also reports the nasal passages are crusting, tender, and swollen 1 month. The patient has not taken any treatment for symptoms. The patient reports unchanged symptoms over time. The patient denies bleeding, drainage, fever, lymphogenic streaking, myalgia, arthralgia, chills, and lethargy. The patient also denies headache, chest pain, abdominal pain, shortness of breath, and nausea / vomiting / diarrhea. ROS:   Unless otherwise stated in this report the patient's positive and negative responses for review of systems for constitutional, eyes, ENT, cardiovascular, respiratory, gastrointestinal, neurological, , musculoskeletal, and integument systems and related systems to the presenting problem are either stated in the history of present illness or were not pertinent or were negative for the symptoms and/or complaints related to the presenting medical problem. Positives and pertinent negatives as per HPI. All others reviewed and are negative.       PMH:     Past Medical History:   Diagnosis Date    Abnormal Pap smear of cervix     Colitis     Ulcerative colitis [facility])        Past Surgical History:   Procedure Laterality Date    COLONOSCOPY      COLONOSCOPY N/A 2022    COLONOSCOPY WITH BIOPSY performed by Dian Robison MD at [facility] ENDOSCOPY    COLONOSCOPY N/A 3/31/2023    COLONOSCOPY WITH BIOPSY performed by Dina Robison MD at [facility] ENDOSCOPY       Family History   Problem Relation Age of Onset    Thyroid Disease Father        Medications:     Current Outpatient Medications:     doxycycline hyclate (VIBRAMYCIN) 100 MG capsule, Take 1 capsule
range of motion and neck supple. Lymphadenopathy:      Cervical: No cervical adenopathy. Skin:     General: Skin is warm and dry. Capillary Refill: Capillary refill takes less than 2 seconds. Neurological:      General: No focal deficit present. Mental Status: She is alert and oriented to person, place, and time. Psychiatric:         Mood and Affect: Mood normal.         Behavior: Behavior normal.          Testing:   (All laboratory and radiology results have been personally reviewed by myself)  Labs:  No results found for this visit on 09/25/23. Imaging: All Radiology results interpreted by Radiologist unless otherwise noted. No orders to display       Assessment / Plan:   The patient's vitals, allergies, medications, and past medical history have been reviewed. Marcell Anne was seen today for facial swelling. Diagnoses and all orders for this visit:    Maxillary sinusitis, unspecified chronicity  -     doxycycline hyclate (VIBRAMYCIN) 100 MG capsule; Take 1 capsule by mouth 2 times daily for 10 days    Other orders  -     mupirocin (BACTROBAN) 2 % ointment; Apply topically 2 times daily.        - Disposition: Home    - Educational material printed for patient's review and were included in patient instructions. After Visit Summary was given to patient at the end of visit. - Bactroban ointment for nasal excoriation.    - Encouraged oral fluids and rest. Discussed symptomatic treatments with patient today (Zyrtec prn rhinitis). The patient is to follow-up with PCP in the next 2-3 days for reevaluation. Red flag symptoms were also discussed with the patient today. If symptoms worsen the patient is to go directly to the emergency department for reevaluation and treatment. Pt verbalizes understanding and is in agreement with plan of care. All questions answered. SIGNATURE: Brenna Kirkpatrick, APRN-CNP    *NOTE: This report was transcribed using voice recognition software.  Every effort was made to

## 2023-10-13 ENCOUNTER — HOSPITAL ENCOUNTER (OUTPATIENT)
Age: 23
Discharge: HOME | End: 2023-10-13
Payer: COMMERCIAL

## 2023-10-13 DIAGNOSIS — K51.30 ULCERATIVE RECTOSIGMOIDITIS WITHOUT COMPLICATION (HCC): ICD-10-CM

## 2023-10-13 LAB
25(OH)D3 SERPL-MCNC: 37.4 NG/ML (ref 30–100)
ALBUMIN SERPL-MCNC: 4.4 G/DL (ref 3.5–5.2)
ALP SERPL-CCNC: 57 U/L (ref 35–104)
ALT SERPL-CCNC: 9 U/L (ref 0–32)
ANION GAP SERPL CALCULATED.3IONS-SCNC: 10 MMOL/L (ref 7–16)
AST SERPL-CCNC: 22 U/L (ref 0–31)
BASOPHILS # BLD: 0.02 K/UL (ref 0–0.2)
BASOPHILS NFR BLD: 1 % (ref 0–2)
BILIRUB SERPL-MCNC: 0.2 MG/DL (ref 0–1.2)
BUN SERPL-MCNC: 9 MG/DL (ref 6–20)
CALCIUM SERPL-MCNC: 9.4 MG/DL (ref 8.6–10.2)
CHLORIDE SERPL-SCNC: 101 MMOL/L (ref 98–107)
CO2 SERPL-SCNC: 27 MMOL/L (ref 22–29)
CREAT SERPL-MCNC: 0.9 MG/DL (ref 0.5–1)
CRP SERPL HS-MCNC: <3 MG/L (ref 0–5)
EOSINOPHIL # BLD: 0.05 K/UL (ref 0.05–0.5)
EOSINOPHILS RELATIVE PERCENT: 1 % (ref 0–6)
ERYTHROCYTE [DISTWIDTH] IN BLOOD BY AUTOMATED COUNT: 12 % (ref 11.5–15)
ERYTHROCYTE [SEDIMENTATION RATE] IN BLOOD BY WESTERGREN METHOD: 25 MM/HR (ref 0–20)
FOLATE SERPL-MCNC: 19.1 NG/ML (ref 4.8–24.2)
GFR SERPL CREATININE-BSD FRML MDRD: >60 ML/MIN/1.73M2
GLUCOSE SERPL-MCNC: 99 MG/DL (ref 74–99)
HCT VFR BLD AUTO: 43.6 % (ref 34–48)
HGB BLD-MCNC: 14.7 G/DL (ref 11.5–15.5)
IMM GRANULOCYTES # BLD AUTO: <0.03 K/UL (ref 0–0.58)
IMM GRANULOCYTES NFR BLD: 0 % (ref 0–5)
LYMPHOCYTES NFR BLD: 1.96 K/UL (ref 1.5–4)
LYMPHOCYTES RELATIVE PERCENT: 48 % (ref 20–42)
MCH RBC QN AUTO: 30.4 PG (ref 26–35)
MCHC RBC AUTO-ENTMCNC: 33.7 G/DL (ref 32–34.5)
MCV RBC AUTO: 90.1 FL (ref 80–99.9)
MONOCYTES NFR BLD: 0.46 K/UL (ref 0.1–0.95)
MONOCYTES NFR BLD: 11 % (ref 2–12)
NEUTROPHILS NFR BLD: 40 % (ref 43–80)
NEUTS SEG NFR BLD: 1.63 K/UL (ref 1.8–7.3)
PLATELET # BLD AUTO: 225 K/UL (ref 130–450)
PMV BLD AUTO: 9.8 FL (ref 7–12)
POTASSIUM SERPL-SCNC: 3.8 MMOL/L (ref 3.5–5)
PROT SERPL-MCNC: 7.5 G/DL (ref 6.4–8.3)
RBC # BLD AUTO: 4.84 M/UL (ref 3.5–5.5)
SODIUM SERPL-SCNC: 138 MMOL/L (ref 132–146)
VIT B12 SERPL-MCNC: 475 PG/ML (ref 211–946)
WBC OTHER # BLD: 4.1 K/UL (ref 4.5–11.5)

## 2023-10-13 PROCEDURE — 82607 VITAMIN B-12: CPT

## 2023-10-13 PROCEDURE — 85025 COMPLETE CBC W/AUTO DIFF WBC: CPT

## 2023-10-13 PROCEDURE — 86140 C-REACTIVE PROTEIN: CPT

## 2023-10-13 PROCEDURE — 82746 ASSAY OF FOLIC ACID SERUM: CPT

## 2023-10-13 PROCEDURE — 85652 RBC SED RATE AUTOMATED: CPT

## 2023-10-13 PROCEDURE — 80053 COMPREHEN METABOLIC PANEL: CPT

## 2023-10-13 PROCEDURE — 36415 COLL VENOUS BLD VENIPUNCTURE: CPT

## 2023-10-13 PROCEDURE — 82306 VITAMIN D 25 HYDROXY: CPT

## 2023-10-18 ENCOUNTER — OFFICE VISIT (OUTPATIENT)
Dept: GASTROENTEROLOGY | Age: 23
End: 2023-10-18
Payer: COMMERCIAL

## 2023-10-18 VITALS
DIASTOLIC BLOOD PRESSURE: 82 MMHG | HEART RATE: 89 BPM | BODY MASS INDEX: 20.82 KG/M2 | SYSTOLIC BLOOD PRESSURE: 112 MMHG | OXYGEN SATURATION: 95 % | WEIGHT: 129 LBS

## 2023-10-18 DIAGNOSIS — K51.20 ULCERATIVE PROCTITIS WITHOUT COMPLICATION (HCC): Primary | ICD-10-CM

## 2023-10-18 PROCEDURE — 99212 OFFICE O/P EST SF 10 MIN: CPT | Performed by: STUDENT IN AN ORGANIZED HEALTH CARE EDUCATION/TRAINING PROGRAM

## 2023-10-19 ENCOUNTER — TELEPHONE (OUTPATIENT)
Age: 23
End: 2023-10-19

## 2023-10-19 NOTE — TELEPHONE ENCOUNTER
Prior Authorization Form:      DEMOGRAPHICS:                     Patient Name:  Selam Camejo  Patient :  2000            Insurance:  Payor: [employer] / Plan: [place] / Product Type: *No Product type* /   Insurance ID Number:    Payer/Plan Subscr  Sex Relation Sub. Ins. ID Effective Group Num   1.  Bertin J 1964 Male Child [id number]                                   [address]         DIAGNOSIS & PROCEDURE:                       Procedure/Operation: colonoscopy           CPT Code: 47250    Diagnosis:  ulcerative colitis    ICD10 Code: k51.90    Location:  [facility]    Surgeon:  Heath Ventura INFORMATION:                          Date: 3-    Time: tbd              Anesthesia:  MAC/TIVA                                                       Status:  Outpatient        Special Comments:  na       Electronically signed by Kyle Garcia LPN on 3593 at 7:53 AM

## 2023-12-01 ENCOUNTER — OFFICE VISIT (OUTPATIENT)
Dept: FAMILY MEDICINE CLINIC | Age: 23
End: 2023-12-01
Payer: COMMERCIAL

## 2023-12-01 VITALS
HEART RATE: 80 BPM | TEMPERATURE: 98.1 F | DIASTOLIC BLOOD PRESSURE: 62 MMHG | OXYGEN SATURATION: 99 % | BODY MASS INDEX: 20.6 KG/M2 | WEIGHT: 128.2 LBS | HEIGHT: 66 IN | SYSTOLIC BLOOD PRESSURE: 90 MMHG

## 2023-12-01 DIAGNOSIS — U07.1 COVID-19: Primary | ICD-10-CM

## 2023-12-01 DIAGNOSIS — J02.9 SORE THROAT: ICD-10-CM

## 2023-12-01 DIAGNOSIS — R09.81 NASAL CONGESTION: ICD-10-CM

## 2023-12-01 LAB
Lab: ABNORMAL
PERFORMING INSTRUMENT: ABNORMAL
QC PASS/FAIL: ABNORMAL
S PYO AG THROAT QL: NORMAL
SARS-COV-2, POC: DETECTED

## 2023-12-01 PROCEDURE — 87426 SARSCOV CORONAVIRUS AG IA: CPT

## 2023-12-01 PROCEDURE — 87880 STREP A ASSAY W/OPTIC: CPT

## 2023-12-01 PROCEDURE — 99213 OFFICE O/P EST LOW 20 MIN: CPT

## 2023-12-01 RX ORDER — METHYLPREDNISOLONE 4 MG/1
TABLET ORAL
Qty: 1 KIT | Refills: 0 | Status: SHIPPED | OUTPATIENT
Start: 2023-12-01

## 2023-12-01 NOTE — PROGRESS NOTES
CP, calf pain/swelling, shaking chills, vomiting, abdominal pain, lethargy, flank pain, or decreased urinary output. Pt verbalizes understanding and is in agreement with plan of care. All questions answered. JOHN Pacheco    **This report was transcribed using voice recognition software. Every effort was made to ensure accuracy; however, inadvertent computerized transcription errors may be present.

## 2023-12-26 RX ORDER — MESALAMINE 1.2 G/1
TABLET, DELAYED RELEASE ORAL
Qty: 90 TABLET | Refills: 2 | Status: SHIPPED | OUTPATIENT
Start: 2023-12-26

## 2024-01-03 ENCOUNTER — OFFICE VISIT (OUTPATIENT)
Dept: FAMILY MEDICINE CLINIC | Age: 24
End: 2024-01-03
Payer: COMMERCIAL

## 2024-01-03 VITALS
WEIGHT: 125.2 LBS | DIASTOLIC BLOOD PRESSURE: 60 MMHG | HEART RATE: 87 BPM | OXYGEN SATURATION: 97 % | HEIGHT: 66 IN | SYSTOLIC BLOOD PRESSURE: 98 MMHG | BODY MASS INDEX: 20.12 KG/M2 | TEMPERATURE: 97.7 F

## 2024-01-03 DIAGNOSIS — J34.89 SORE IN NOSE: Primary | ICD-10-CM

## 2024-01-03 PROCEDURE — 99213 OFFICE O/P EST LOW 20 MIN: CPT

## 2024-01-03 RX ORDER — SULFAMETHOXAZOLE AND TRIMETHOPRIM 800; 160 MG/1; MG/1
1 TABLET ORAL 2 TIMES DAILY
Qty: 14 TABLET | Refills: 0 | Status: SHIPPED | OUTPATIENT
Start: 2024-01-03 | End: 2024-01-10

## 2024-01-03 NOTE — PROGRESS NOTES
CTAB without wheezes, rales, or rhonchi  Heart:  Regular rate and rhythm, normal heart sounds, without pathological murmurs, ectopy, gallops, or rubs.  Skin:  Normal turgor.  Warm, dry, without visible rash.  Neurological:  Alert and oriented.  Motor functions intact.  Responds to verbal commands.     Lab / Imaging Results   (All laboratory and radiology results have been personally reviewed by myself)  Labs:  No results found for this visit on 01/03/24.    Imaging:  All Radiology results interpreted by Radiologist unless otherwise noted.      Assessment / Plan     Impression(s):  Cele was seen today for other.    Diagnoses and all orders for this visit:    Sore in nose  -     sulfamethoxazole-trimethoprim (BACTRIM DS) 800-160 MG per tablet; Take 1 tablet by mouth 2 times daily for 7 days      Disposition:  Disposition: Discharge to home.    Vital signs, past medical history, medications, and allergies reviewed at visit.    I will cover for infection with Bactrim.  However, I do believe dry irritated skin more likely cause of symptoms.  Advised Aquaphor over-the-counter topically as well as warm air humidifier.  Additional symptomatic relief discussed.    Script written for Bactrim, side effects discussed.      Follow up PCP no improvement after 7-10 days of symptoms. ED sooner if symptoms worsen or change. Red flag symptoms discussed. Pt is in agreement with this care plan. All questions answered.    JOHN Delcid    **This report was transcribed using voice recognition software. Every effort was made to ensure accuracy; however, inadvertent computerized transcription errors may be present.

## 2024-01-08 ENCOUNTER — TELEPHONE (OUTPATIENT)
Age: 24
End: 2024-01-08

## 2024-01-08 NOTE — TELEPHONE ENCOUNTER
Lmom to call and r/s 3-27-24 appt with dr payton. Electronically signed by Tatyana Torres LPN on 1/8/2024 at 9:45 AM

## 2024-01-11 ENCOUNTER — OFFICE VISIT (OUTPATIENT)
Dept: FAMILY MEDICINE CLINIC | Age: 24
End: 2024-01-11
Payer: COMMERCIAL

## 2024-01-11 VITALS
SYSTOLIC BLOOD PRESSURE: 120 MMHG | HEIGHT: 66 IN | TEMPERATURE: 97.8 F | DIASTOLIC BLOOD PRESSURE: 80 MMHG | WEIGHT: 125 LBS | OXYGEN SATURATION: 98 % | BODY MASS INDEX: 20.09 KG/M2 | HEART RATE: 105 BPM

## 2024-01-11 DIAGNOSIS — R23.2 FACIAL FLUSHING: Primary | ICD-10-CM

## 2024-01-11 PROCEDURE — 82962 GLUCOSE BLOOD TEST: CPT

## 2024-01-11 PROCEDURE — 99214 OFFICE O/P EST MOD 30 MIN: CPT

## 2024-01-11 RX ORDER — SULFAMETHOXAZOLE AND TRIMETHOPRIM 800; 160 MG/1; MG/1
1 TABLET ORAL 2 TIMES DAILY
COMMUNITY
End: 2024-01-11 | Stop reason: SINTOL

## 2024-01-11 NOTE — PROGRESS NOTES
Appearance/Constitutional:  Alert, development consistent with age.  HEENT:  NC/NT. PERRLA.  Airway patent.  Nasal mucosa without remaining lesion, mild erythema bilaterally located.  Neck:  Normal ROM.  Supple.  Respiratory:  Clear to auscultation and breath sounds equal.  CV:  Regular rate and rhythm, normal heart sounds, without pathological murmurs, ectopy, gallops, or rubs.  GI:  Abdomen Soft, nontender, good bowel sounds.  No firm or pulsatile mass.  Back:  No costovertebral tenderness.  Extremities: No tenderness or edema noted.  Integument:  Normal turgor.  Warm, dry, without visible rash, unless noted elsewhere.  There is mild flushing to cheeks bilaterally.  Lymphatics: No lymphangitis or adenopathy noted.  Neurological:  Oriented.  Motor functions intact.     Test Results Section   (All laboratory and radiology results have been personally reviewed by myself)  Labs:  No results found for this visit on 01/11/24.     Imaging:  All Radiology results interpreted by Radiologist unless otherwise noted.  No results found.    Assessment / Plan   Impression(s):  Cele was seen today for other.    Diagnoses and all orders for this visit:    Facial flushing  -     POCT Glucose        Vital signs, past medical history, medications, and allergies reviewed at visit.    On exam pt does not appear to be in any acute distress, toxic, or lethargic.     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  I did check glucose in office, 120.  She did have a avocado toast and salguero prior to visit.    Advised Zyrtec or Benadryl over-the-counter.    I did explain to patient that I would like her to stop Bactrim.  I am concerned for reaction to medication.  Additionally, differential diagnosis includes body reaction to her new diet or new protein product.  I explained to slowly introduce new trend/products to better narrow down cause of symptoms.  She is agreeable.  She will stop Bactrim

## 2024-01-15 RX ORDER — MESALAMINE 1.2 G/1
TABLET, DELAYED RELEASE ORAL
Qty: 90 TABLET | Refills: 2 | Status: SHIPPED | OUTPATIENT
Start: 2024-01-15

## 2024-02-05 DIAGNOSIS — K51.20 ULCERATIVE PROCTITIS WITHOUT COMPLICATION (HCC): Primary | ICD-10-CM

## 2024-02-05 RX ORDER — MESALAMINE 1.2 G/1
4800 TABLET, DELAYED RELEASE ORAL
Qty: 360 TABLET | Refills: 2 | Status: SHIPPED | OUTPATIENT
Start: 2024-02-05

## 2024-02-05 NOTE — TELEPHONE ENCOUNTER
Please send refill for Mesalamine 1.2 GM, 4 po daily to [facility] - 90 day. Electronically signed by Tatyana Torres LPN on 2/5/2024 at 11:29 AM    Called in a 7 day supply to local pharmacy until her rx is mailed to her. Electronically signed by Tatyana Torres LPN on 2/5/2024 at 11:30 AM

## 2024-03-01 ENCOUNTER — OFFICE VISIT (OUTPATIENT)
Dept: PRIMARY CARE CLINIC | Age: 24
End: 2024-03-01
Payer: COMMERCIAL

## 2024-03-01 VITALS
OXYGEN SATURATION: 98 % | RESPIRATION RATE: 17 BRPM | DIASTOLIC BLOOD PRESSURE: 80 MMHG | TEMPERATURE: 98 F | SYSTOLIC BLOOD PRESSURE: 130 MMHG | HEIGHT: 66 IN | BODY MASS INDEX: 20.09 KG/M2 | WEIGHT: 125 LBS | HEART RATE: 78 BPM

## 2024-03-01 DIAGNOSIS — R09.81 NASAL CONGESTION: ICD-10-CM

## 2024-03-01 DIAGNOSIS — J01.00 ACUTE NON-RECURRENT MAXILLARY SINUSITIS: ICD-10-CM

## 2024-03-01 DIAGNOSIS — Z00.00 ENCOUNTER FOR WELL ADULT EXAM WITHOUT ABNORMAL FINDINGS: Primary | ICD-10-CM

## 2024-03-01 PROCEDURE — 99395 PREV VISIT EST AGE 18-39: CPT | Performed by: PHYSICIAN ASSISTANT

## 2024-03-01 PROCEDURE — 99213 OFFICE O/P EST LOW 20 MIN: CPT | Performed by: PHYSICIAN ASSISTANT

## 2024-03-01 RX ORDER — PREDNISONE 10 MG/1
10 TABLET ORAL 2 TIMES DAILY
Qty: 10 TABLET | Refills: 0 | Status: SHIPPED | OUTPATIENT
Start: 2024-03-01 | End: 2024-03-06

## 2024-03-01 RX ORDER — SODIUM CHLORIDE/ALOE VERA
GEL (GRAM) NASAL PRN
Qty: 1 EACH | Refills: 1 | Status: SHIPPED | OUTPATIENT
Start: 2024-03-01

## 2024-03-01 RX ORDER — AMOXICILLIN AND CLAVULANATE POTASSIUM 875; 125 MG/1; MG/1
1 TABLET, FILM COATED ORAL 2 TIMES DAILY
Qty: 20 TABLET | Refills: 0 | Status: SHIPPED | OUTPATIENT
Start: 2024-03-01 | End: 2024-03-11

## 2024-03-01 ASSESSMENT — PATIENT HEALTH QUESTIONNAIRE - PHQ9
SUM OF ALL RESPONSES TO PHQ QUESTIONS 1-9: 0
SUM OF ALL RESPONSES TO PHQ9 QUESTIONS 1 & 2: 0
1. LITTLE INTEREST OR PLEASURE IN DOING THINGS: 0
SUM OF ALL RESPONSES TO PHQ QUESTIONS 1-9: 0
SUM OF ALL RESPONSES TO PHQ QUESTIONS 1-9: 0
2. FEELING DOWN, DEPRESSED OR HOPELESS: 0
SUM OF ALL RESPONSES TO PHQ QUESTIONS 1-9: 0

## 2024-03-01 NOTE — PROGRESS NOTES
Well Adult Note  Name: Cele Root Today’s Date: 3/7/2024   MRN: [id number] Sex: Female   Age: 23 y.o. Ethnicity: Non- / Non    : 2000 Race: White (non-)      Cele Root is here for well adult exam.  History:  She complains of a \"Nose infection.\"  To clarify, she does not mean sinus pain. She has erythema and pain in the nares. She has been on amoxil and bactroban per [facility] care.   The 2nd time she went to [facility] care, she was given bactrim, and developed a rash.  She does have Nasal congestion, dry, bleeding. She is not using nose sprays or antihistamines currently.     Review of Systems   All other systems reviewed and are negative.      Allergies   Allergen Reactions    Bactrim [Sulfamethoxazole-Trimethoprim] Rash         Prior to Visit Medications    Medication Sig Taking? Authorizing Provider   saline nasal gel (AYR) GEL by Nasal route as needed for Congestion Yes Chio Narayanan PA-C   amoxicillin-clavulanate (AUGMENTIN) 875-125 MG per tablet Take 1 tablet by mouth 2 times daily for 10 days Yes Chio Narayanan PA-C   mesalamine (LIALDA) 1.2 g EC tablet Take 4 tablets by mouth daily (with breakfast) Yes Anum Salas APRN - CNP   mupirocin (BACTROBAN) 2 % ointment Apply topically 2 times daily. Yes Jose Manuel Martinez Jr., APRN - JAH   desogestrel-ethinyl estradiol (ISIBLOOM) 0.15-30 MG-MCG per tablet Take 1 tablet by mouth daily Yes Leigha Pinto DO   spironolactone (ALDACTONE) 25 MG tablet Take 1 tablet by mouth daily Indications: ACNE Yes Provider, MD Devante         Past Medical History:   Diagnosis Date    Abnormal Pap smear of cervix     Colitis     Ulcerative colitis (HCC)        Past Surgical History:   Procedure Laterality Date    COLONOSCOPY      COLONOSCOPY N/A 2022    COLONOSCOPY WITH BIOPSY performed by Luis Carlos Morris MD at [facility] ENDOSCOPY    COLONOSCOPY N/A 3/31/2023    COLONOSCOPY WITH BIOPSY performed by Luis Carlos Morris MD at [facility] ENDOSCOPY

## 2024-03-11 RX ORDER — PREDNISONE 5 MG/1
5 TABLET ORAL 2 TIMES DAILY
COMMUNITY

## 2024-03-11 NOTE — PROGRESS NOTES
[facility] PRE-ADMISSION TESTING INSTRUCTIONS    The Preadmission Testing patient is instructed accordingly using the following criteria (check applicable):    ARRIVAL INSTRUCTIONS:  [x] Parking the day of Surgery is located in the Main Entrance lot.  Upon entering the door, make an immediate right to the surgery reception desk    [x] Bring photo ID and insurance card    [] Bring in a copy of Living will or Durable Power of  papers.    [x] Please be sure to arrange for a responsible adult to provide transportation to and from the hospital    [x] Please arrange for a responsible adult to be with you for the 24 hour period post procedure due to having anesthesia    [x] If you awake am of surgery not feeling well or have temperature >100 please call [phone]    GENERAL INSTRUCTIONS:    [x] No solid foods after midnight, may have up to 8oz of water until 4 hours prior to surgery. No gum, no candy, no mints. NPO time:0530       [x] You may brush your teeth, do not swallow any toothpaste    [x] Take medications as instructed     [x] Stop herbal supplements and vitamins 5 days prior to procedure    [x] Follow preop dosing of blood thinners per physician instructions    [] Take 1/2 dose of evening insulin, but no insulin after midnight    [] No oral diabetic medications after midnight    [] If diabetic and have low blood sugar or feel symptomatic, take 1-2oz apple juice only    [] Bring inhalers day of surgery    [x] Bring urine specimen day of surgery    [x] Shower or bath with soap, lather and rinse well, AM of Surgery, no lotion, powders or creams to surgical site    [x] Follow bowel prep as instructed per surgeon    [x] No tobacco products within 24 hours of surgery     [x] No alcohol or illegal drug use, marijuana included, within 24 hours of surgery.    [x] Jewelry, body piercing's, eyeglasses, contact lenses and dentures are not permitted into surgery (bring cases)      [x]  Please do not wear any nail polish, make up or hair products on the day of surgery    [x] You can expect a call the business day prior to procedure to notify you if your arrival time changes    [x] If you receive a survey after surgery we would greatly appreciate your comments    [] Parent/guardian of a minor must accompany their child and remain on the premises  the entire time they are under our care     [] Pediatric patients may bring favorite toy, blanket or comfort item with them    [] A caregiver or family member must remain with the patient during their stay if they are mentally handicapped, have dementia, disoriented or unable to use a call light or would be a safety concern if left unattended    [x] Please notify surgeon if you develop any illness between now and time of surgery (cold, cough, sore throat, fever, nausea, vomiting) or any signs of infections  including skin, wounds, and dental.    []  The Outpatient Pharmacy is available to fill your prescription here on your day of surgery, ask your preop nurse for details    [] Other instructions    EDUCATIONAL MATERIALS PROVIDED:    [] PAT Preoperative Education Packet/Booklet     [] Medication List    [] Transfusion bracelet applied with instructions    [] Shower with soap, lather and rinse well, and use CHG wipes provided the evening before surgery as instructed    [] Incentive spirometer with instructions

## 2024-03-13 ENCOUNTER — ANESTHESIA EVENT (OUTPATIENT)
Dept: ENDOSCOPY | Age: 24
End: 2024-03-13
Payer: COMMERCIAL

## 2024-03-13 ASSESSMENT — LIFESTYLE VARIABLES: SMOKING_STATUS: 0

## 2024-03-13 NOTE — ANESTHESIA PRE PROCEDURE
CALCIUM 9.4 10/13/2023 12:27 PM    BILITOT 0.2 10/13/2023 12:27 PM    ALKPHOS 57 10/13/2023 12:27 PM    AST 22 10/13/2023 12:27 PM    ALT 9 10/13/2023 12:27 PM       POC Tests: No results for input(s): \"POCGLU\", \"POCNA\", \"POCK\", \"POCCL\", \"POCBUN\", \"POCHEMO\", \"POCHCT\" in the last 72 hours.    Coags: No results found for: \"PROTIME\", \"INR\", \"APTT\"    HCG (If Applicable):   Lab Results   Component Value Date    PREGTESTUR negative 09/02/2021        ABGs: No results found for: \"PHART\", \"PO2ART\", \"DWH7OUL\", \"QNR0KYP\", \"BEART\", \"R9ZWOZXE\"     Type & Screen (If Applicable):  No results found for: \"LABABO\", \"LABRH\"    Drug/Infectious Status (If Applicable):  No results found for: \"HIV\", \"HEPCAB\"    COVID-19 Screening (If Applicable):   Lab Results   Component Value Date/Time    COVID19 Detected 12/01/2023 03:58 PM           Anesthesia Evaluation  Patient summary reviewed   no history of anesthetic complications:   Airway: Mallampati: I  TM distance: >3 FB   Neck ROM: full  Mouth opening: > = 3 FB   Dental: normal exam         Pulmonary:Negative Pulmonary ROS       (-) not a current smoker                           Cardiovascular:Negative CV ROS  Exercise tolerance: good (>4 METS)           Beta Blocker:  Not on Beta Blocker         Neuro/Psych:   Negative Neuro/Psych ROS              GI/Hepatic/Renal:   (+) PUD, bowel prep         ROS comment: UC.   Endo/Other:                     Abdominal:             Vascular: negative vascular ROS.         Other Findings:         Anesthesia Plan      MAC     ASA 2       Induction: intravenous.      Anesthetic plan and risks discussed with patient.      Plan discussed with CRNA.                  Dilcia Leyva DO   3/13/2024

## 2024-03-14 ENCOUNTER — ANESTHESIA (OUTPATIENT)
Dept: ENDOSCOPY | Age: 24
End: 2024-03-14
Payer: COMMERCIAL

## 2024-03-14 ENCOUNTER — HOSPITAL ENCOUNTER (OUTPATIENT)
Age: 24
Setting detail: OUTPATIENT SURGERY
Discharge: HOME | End: 2024-03-14
Attending: STUDENT IN AN ORGANIZED HEALTH CARE EDUCATION/TRAINING PROGRAM | Admitting: STUDENT IN AN ORGANIZED HEALTH CARE EDUCATION/TRAINING PROGRAM
Payer: COMMERCIAL

## 2024-03-14 VITALS
RESPIRATION RATE: 18 BRPM | OXYGEN SATURATION: 100 % | HEART RATE: 72 BPM | HEIGHT: 66 IN | BODY MASS INDEX: 19.29 KG/M2 | DIASTOLIC BLOOD PRESSURE: 68 MMHG | WEIGHT: 120 LBS | SYSTOLIC BLOOD PRESSURE: 105 MMHG

## 2024-03-14 DIAGNOSIS — K51.90 ULCERATIVE COLITIS (HCC): ICD-10-CM

## 2024-03-14 PROBLEM — Z87.19 HISTORY OF ULCERATIVE COLITIS: Status: ACTIVE | Noted: 2024-03-14

## 2024-03-14 LAB
GLUCOSE BLD-MCNC: 68 MG/DL (ref 74–99)
HCG, URINE, POC: NEGATIVE
Lab: NORMAL
NEGATIVE QC PASS/FAIL: NORMAL
POSITIVE QC PASS/FAIL: NORMAL

## 2024-03-14 PROCEDURE — 6360000002 HC RX W HCPCS: Performed by: NURSE ANESTHETIST, CERTIFIED REGISTERED

## 2024-03-14 PROCEDURE — 3700000001 HC ADD 15 MINUTES (ANESTHESIA): Performed by: STUDENT IN AN ORGANIZED HEALTH CARE EDUCATION/TRAINING PROGRAM

## 2024-03-14 PROCEDURE — 7100000010 HC PHASE II RECOVERY - FIRST 15 MIN: Performed by: STUDENT IN AN ORGANIZED HEALTH CARE EDUCATION/TRAINING PROGRAM

## 2024-03-14 PROCEDURE — 7100000011 HC PHASE II RECOVERY - ADDTL 15 MIN: Performed by: STUDENT IN AN ORGANIZED HEALTH CARE EDUCATION/TRAINING PROGRAM

## 2024-03-14 PROCEDURE — 2709999900 HC NON-CHARGEABLE SUPPLY: Performed by: STUDENT IN AN ORGANIZED HEALTH CARE EDUCATION/TRAINING PROGRAM

## 2024-03-14 PROCEDURE — 3609008300 HC SIGMOIDOSCOPY W/BIOPSY SINGLE/MULTIPLE: Performed by: STUDENT IN AN ORGANIZED HEALTH CARE EDUCATION/TRAINING PROGRAM

## 2024-03-14 PROCEDURE — 88305 TISSUE EXAM BY PATHOLOGIST: CPT

## 2024-03-14 PROCEDURE — 3700000000 HC ANESTHESIA ATTENDED CARE: Performed by: STUDENT IN AN ORGANIZED HEALTH CARE EDUCATION/TRAINING PROGRAM

## 2024-03-14 PROCEDURE — 82962 GLUCOSE BLOOD TEST: CPT

## 2024-03-14 PROCEDURE — 45331 SIGMOIDOSCOPY AND BIOPSY: CPT | Performed by: STUDENT IN AN ORGANIZED HEALTH CARE EDUCATION/TRAINING PROGRAM

## 2024-03-14 PROCEDURE — 2580000003 HC RX 258: Performed by: NURSE ANESTHETIST, CERTIFIED REGISTERED

## 2024-03-14 RX ORDER — ONDANSETRON 2 MG/ML
4 INJECTION INTRAMUSCULAR; INTRAVENOUS EVERY 6 HOURS PRN
Status: CANCELLED | OUTPATIENT
Start: 2024-03-14

## 2024-03-14 RX ORDER — ONDANSETRON 4 MG/1
4 TABLET, ORALLY DISINTEGRATING ORAL EVERY 8 HOURS PRN
Status: CANCELLED | OUTPATIENT
Start: 2024-03-14

## 2024-03-14 RX ORDER — SODIUM CHLORIDE 9 MG/ML
INJECTION, SOLUTION INTRAVENOUS PRN
Status: CANCELLED | OUTPATIENT
Start: 2024-03-14

## 2024-03-14 RX ORDER — SODIUM CHLORIDE 0.9 % (FLUSH) 0.9 %
5-40 SYRINGE (ML) INJECTION EVERY 12 HOURS SCHEDULED
Status: CANCELLED | OUTPATIENT
Start: 2024-03-14

## 2024-03-14 RX ORDER — SODIUM CHLORIDE 0.9 % (FLUSH) 0.9 %
5-40 SYRINGE (ML) INJECTION PRN
Status: CANCELLED | OUTPATIENT
Start: 2024-03-14

## 2024-03-14 RX ORDER — PROPOFOL 10 MG/ML
INJECTION, EMULSION INTRAVENOUS PRN
Status: DISCONTINUED | OUTPATIENT
Start: 2024-03-14 | End: 2024-03-14 | Stop reason: SDUPTHER

## 2024-03-14 RX ORDER — SODIUM CHLORIDE 9 MG/ML
INJECTION, SOLUTION INTRAVENOUS CONTINUOUS PRN
Status: DISCONTINUED | OUTPATIENT
Start: 2024-03-14 | End: 2024-03-14 | Stop reason: SDUPTHER

## 2024-03-14 RX ADMIN — SODIUM CHLORIDE: 9 INJECTION, SOLUTION INTRAVENOUS at 10:42

## 2024-03-14 RX ADMIN — PROPOFOL 230 MG: 10 INJECTION, EMULSION INTRAVENOUS at 11:00

## 2024-03-14 ASSESSMENT — PAIN - FUNCTIONAL ASSESSMENT: PAIN_FUNCTIONAL_ASSESSMENT: NONE - DENIES PAIN

## 2024-03-14 NOTE — H&P
[facility]   Gastroenterology, Hepatology, &  Advanced Endoscopy    Consult       ASSESSMENT AND PLAN:    23y/F w/ Ulcerative Proctitis who presents for endoscopic evaluation.     PLAN:  Flex Sig today        HISTORY OF PRESENT ILLNESS:      Ms. Cele Root is a 23y/F w/ Ulcerative Proctitis who presents for endoscopic evaluation.     Past Medical History:        Diagnosis Date    Abnormal Pap smear of cervix     Colitis     Sinusitis, acute maxillary 03/07/2024    Left . finished ATB/steroid 3/13/24. Pt reports issue resolved. denies drainage,fever or open area.     Past Surgical History:        Procedure Laterality Date    COLONOSCOPY      COLONOSCOPY N/A 6/8/2022    COLONOSCOPY WITH BIOPSY performed by Luis Carlos Morris MD at [facility]    COLONOSCOPY N/A 3/31/2023    COLONOSCOPY WITH BIOPSY performed by Luis Carlos Morris MD at [facility]     Social History:    TOBACCO:   reports that she has never smoked. She has never used smokeless tobacco.  ETOH:   reports current alcohol use.  DRUGS:   reports no history of drug use.  Family History:       Problem Relation Age of Onset    Thyroid Disease Father        No current facility-administered medications for this encounter.    Current Outpatient Medications:     predniSONE (DELTASONE) 5 MG tablet, Take 1 tablet by mouth 2 times daily X 5 days. Last dose to be taken 3/13/24 per pt, Disp: , Rfl:     saline nasal gel (AYR) GEL, by Nasal route as needed for Congestion, Disp: 1 each, Rfl: 1    mesalamine (LIALDA) 1.2 g EC tablet, Take 4 tablets by mouth daily (with breakfast), Disp: 360 tablet, Rfl: 2    desogestrel-ethinyl estradiol (ISIBLOOM) 0.15-30 MG-MCG per tablet, Take 1 tablet by mouth daily, Disp: 1 packet, Rfl: 11    spironolactone (ALDACTONE) 25 MG tablet, Take 1 tablet by mouth daily Indications: ACNE, Disp: , Rfl:      Allergies:  Bactrim [sulfamethoxazole-trimethoprim]    ROS:  General: Patient denies n/v/f/c or weight loss.  HEENT: Patient denies

## 2024-03-19 LAB — SURGICAL PATHOLOGY REPORT: NORMAL

## 2024-03-22 NOTE — PATIENT INSTRUCTIONS
hands, brush your teeth twice a day, and wear a seat belt in the car.   Where can you learn more?  Go to [email or web address]://[email or web address].net/patientEd and enter P072 to learn more about \"Well Visit, Ages 18 to 65: Care Instructions.\"  Current as of: August 6, 2023               Content Version: 13.9  © 1926-3707 [employer], [employer].   Care instructions adapted under license by [employer]. If you have questions about a medical condition or this instruction, always ask your healthcare professional. [employer] disclaims any warranty or liability for your use of this information.      
[CV Risk Factors and Non-Cardiac Disease] : CV risk factors and non-cardiac disease

## 2024-06-14 ENCOUNTER — OFFICE VISIT (OUTPATIENT)
Dept: PRIMARY CARE CLINIC | Age: 24
End: 2024-06-14
Payer: COMMERCIAL

## 2024-06-14 VITALS
OXYGEN SATURATION: 98 % | HEART RATE: 90 BPM | RESPIRATION RATE: 17 BRPM | SYSTOLIC BLOOD PRESSURE: 116 MMHG | TEMPERATURE: 97 F | DIASTOLIC BLOOD PRESSURE: 75 MMHG | BODY MASS INDEX: 19.77 KG/M2 | HEIGHT: 66 IN | WEIGHT: 123 LBS

## 2024-06-14 DIAGNOSIS — G47.23 IRREGULAR SLEEP-WAKE RHYTHM: ICD-10-CM

## 2024-06-14 DIAGNOSIS — J34.9 SINUS PROBLEM: Primary | ICD-10-CM

## 2024-06-14 DIAGNOSIS — J34.9 SINUS PROBLEM: ICD-10-CM

## 2024-06-14 PROCEDURE — 87426 SARSCOV CORONAVIRUS AG IA: CPT | Performed by: PHYSICIAN ASSISTANT

## 2024-06-14 PROCEDURE — 99213 OFFICE O/P EST LOW 20 MIN: CPT | Performed by: PHYSICIAN ASSISTANT

## 2024-06-14 PROCEDURE — 87880 STREP A ASSAY W/OPTIC: CPT | Performed by: PHYSICIAN ASSISTANT

## 2024-06-14 RX ORDER — AMOXICILLIN AND CLAVULANATE POTASSIUM 875; 125 MG/1; MG/1
1 TABLET, FILM COATED ORAL 2 TIMES DAILY
Qty: 20 TABLET | Refills: 0 | Status: SHIPPED | OUTPATIENT
Start: 2024-06-14 | End: 2024-06-24

## 2024-06-14 RX ORDER — FLUTICASONE PROPIONATE 50 MCG
2 SPRAY, SUSPENSION (ML) NASAL DAILY
Qty: 48 G | OUTPATIENT
Start: 2024-06-14

## 2024-06-14 RX ORDER — HYDROXYZINE HYDROCHLORIDE 25 MG/1
25 TABLET, FILM COATED ORAL NIGHTLY
Qty: 30 TABLET | Refills: 2 | Status: SHIPPED | OUTPATIENT
Start: 2024-06-14 | End: 2024-09-12

## 2024-06-14 RX ORDER — FLUTICASONE PROPIONATE 50 MCG
2 SPRAY, SUSPENSION (ML) NASAL DAILY
Qty: 16 G | Refills: 0 | Status: SHIPPED | OUTPATIENT
Start: 2024-06-14

## 2024-06-14 SDOH — ECONOMIC STABILITY: FOOD INSECURITY: WITHIN THE PAST 12 MONTHS, YOU WORRIED THAT YOUR FOOD WOULD RUN OUT BEFORE YOU GOT MONEY TO BUY MORE.: NEVER TRUE

## 2024-06-14 SDOH — ECONOMIC STABILITY: FOOD INSECURITY: WITHIN THE PAST 12 MONTHS, THE FOOD YOU BOUGHT JUST DIDN'T LAST AND YOU DIDN'T HAVE MONEY TO GET MORE.: NEVER TRUE

## 2024-06-14 SDOH — ECONOMIC STABILITY: INCOME INSECURITY: HOW HARD IS IT FOR YOU TO PAY FOR THE VERY BASICS LIKE FOOD, HOUSING, MEDICAL CARE, AND HEATING?: NOT HARD AT ALL

## 2024-06-14 NOTE — PROGRESS NOTES
Chief Complaint   Patient presents with    Sinus Problem     For about 3 days         Congestion:  Patient is here with complaints of congestion, sinus pressure, drainage and cough for 1 week(s).  Cough is not productive.  Patient has not had fever. Patient has had sore throat or ear pain. Over the counter medications include none.  Patient does not have a change in appetite.  Patient is  drinking well.  Patient does not smoke. Patient does not have asthma or COPD.  Sick contacts include none.    Works night, so twice per week she has a hard time getting more than 4 hrs per night. Taking melatonin.    Patient's past medical, surgical, social and/or family history reviewed, updated in chart, and are non-contributory (unless otherwise stated).  Medications and allergies also reviewed and updated in chart.      Review of Systems:  Constitutional:  - fever, + fatigue, + chills, + headaches, no weight change, +reduced appetite  Dermatology:  No rash, no mole, no dry or sensitive skin  ENT:  + cough, + sore throat, + sinus pain, + runny nose, no ear pain  Cardiology:  No chest pain, no palpitations, no leg edema, no shortness of breath, no PND  Gastroenterology:  No dysphagia, no abdominal pain, no nausea, no vomiting, no constipation, no diarrhea, no heartburn  Musculoskeletal:  No joint pain, no leg cramps, no back pain, no muscle aches  Respiratory:  No shortness of breath, no orthopnea, no wheezing, no GOMEZ, no hemoptysis  Urology:  No blood in the urine, no urinary frequency, no urinary incontinence, no urinary urgency, no nocturia, no dysuria      Vitals:    06/14/24 1151   BP: 116/75   Pulse: 90   Resp: 17   Temp: 97 °F (36.1 °C)   SpO2: 98%   Weight: 55.8 kg (123 lb)   Height: 1.676 m (5' 5.98\")       Physical Exam  Constitutional:       General: She is not in acute distress.     Appearance: Normal appearance. She is well-developed.   HENT:      Head: Normocephalic and atraumatic.      Right Ear: External ear

## 2024-07-24 ENCOUNTER — OFFICE VISIT (OUTPATIENT)
Age: 24
End: 2024-07-24
Payer: COMMERCIAL

## 2024-07-24 VITALS
RESPIRATION RATE: 16 BRPM | DIASTOLIC BLOOD PRESSURE: 60 MMHG | TEMPERATURE: 97 F | OXYGEN SATURATION: 99 % | SYSTOLIC BLOOD PRESSURE: 96 MMHG | WEIGHT: 128 LBS | HEIGHT: 65 IN | HEART RATE: 84 BPM | BODY MASS INDEX: 21.33 KG/M2

## 2024-07-24 DIAGNOSIS — K51.20 ULCERATIVE PROCTITIS WITHOUT COMPLICATION (HCC): Primary | ICD-10-CM

## 2024-07-24 PROCEDURE — 99212 OFFICE O/P EST SF 10 MIN: CPT | Performed by: NURSE PRACTITIONER

## 2024-07-24 RX ORDER — MESALAMINE 1.2 G/1
4800 TABLET, DELAYED RELEASE ORAL
Qty: 360 TABLET | Refills: 2 | Status: SHIPPED | OUTPATIENT
Start: 2024-07-24

## 2024-07-24 NOTE — PROGRESS NOTES
Cele Root (:  2000) is a 23 y.o. female, here for evaluation of the following chief complaint(s):  Follow-up (Flex Sigmoidoscopy )      SUBJECTIVE/OBJECTIVE:  HPI:    Cele is a very pleasant 23-year-old female that presents today for follow-up on ulcerative colitis    Patient is currently taking mesalamine.  Colonoscopy  showed inflammation so she was placed on suppositories.  Patient was able to discontinue the rectal suppositories.  Repeat flexible sigmoidoscopy recommended to ensure no progression of rectal inflammation    Flexible sigmoidoscopy 3/24-    The digital rectal exam was normal. The rectum, sigmoid colon and recto-sigmoid colon appeared normal. Very mild erythema noted without kathy inflammation and no ulceration. Biopsies were taken with a cold forceps for histology.    Patient states she is having 2 bowel movements that day that are formed.  No rectal bleeding or melena  Denies abdominal pains or fevers     ROS:  General: Patient denies n/v/f/c or weight loss.  HEENT: Patient denies persistent postnasal drip, scleral icterus, drooling, persistent bleeding from nose/mouth.  Resp: Patient denies SOB, wheezing, productive cough.  Cards: Patient denies CP, palpitations, significant edema  GI: As above.  Derm: Patient denies jaundice/rashes.   Musc: Patient denies diffuse/irregular joint swelling or myalgias.      Objective   Wt Readings from Last 3 Encounters:   24 58.1 kg (128 lb)   24 55.8 kg (123 lb)   24 54.4 kg (120 lb)     Temp Readings from Last 3 Encounters:   24 97 °F (36.1 °C) (Temporal)   24 97 °F (36.1 °C)   24 98 °F (36.7 °C)     BP Readings from Last 3 Encounters:   24 96/60   24 116/75   24 105/68     Pulse Readings from Last 3 Encounters:   24 84   24 90   24 72        Physical Exam  Constitutional:       Appearance: Normal appearance.   Neurological:      Mental Status: She is alert.

## 2025-01-20 ENCOUNTER — HOSPITAL ENCOUNTER (OUTPATIENT)
Age: 25
Discharge: HOME | End: 2025-01-20
Payer: COMMERCIAL

## 2025-01-20 DIAGNOSIS — K51.20 ULCERATIVE PROCTITIS WITHOUT COMPLICATION (HCC): ICD-10-CM

## 2025-01-20 LAB
25(OH)D3 SERPL-MCNC: 35.6 NG/ML (ref 30–100)
ALBUMIN SERPL-MCNC: 4 G/DL (ref 3.5–5.2)
ALBUMIN SERPL-MCNC: 4.4 G/DL (ref 3.5–5.2)
ALP SERPL-CCNC: 49 U/L (ref 35–104)
ALP SERPL-CCNC: 53 U/L (ref 35–104)
ALT SERPL-CCNC: 10 U/L (ref 0–32)
ALT SERPL-CCNC: 10 U/L (ref 0–32)
ANION GAP SERPL CALCULATED.3IONS-SCNC: 10 MMOL/L (ref 7–16)
AST SERPL-CCNC: 20 U/L (ref 0–31)
AST SERPL-CCNC: 24 U/L (ref 0–31)
BASOPHILS # BLD: 0.03 K/UL (ref 0–0.2)
BASOPHILS NFR BLD: 0 % (ref 0–2)
BILIRUB DIRECT SERPL-MCNC: <0.2 MG/DL (ref 0–0.3)
BILIRUB INDIRECT SERPL-MCNC: NORMAL MG/DL (ref 0–1)
BILIRUB SERPL-MCNC: 0.3 MG/DL (ref 0–1.2)
BILIRUB SERPL-MCNC: 0.3 MG/DL (ref 0–1.2)
BUN SERPL-MCNC: 15 MG/DL (ref 6–20)
CALCIUM SERPL-MCNC: 9.5 MG/DL (ref 8.6–10.2)
CHLORIDE SERPL-SCNC: 101 MMOL/L (ref 98–107)
CO2 SERPL-SCNC: 26 MMOL/L (ref 22–29)
CREAT SERPL-MCNC: 0.9 MG/DL (ref 0.5–1)
CRP SERPL HS-MCNC: <3 MG/L (ref 0–5)
EOSINOPHIL # BLD: 0.04 K/UL (ref 0.05–0.5)
EOSINOPHILS RELATIVE PERCENT: 1 % (ref 0–6)
ERYTHROCYTE [DISTWIDTH] IN BLOOD BY AUTOMATED COUNT: 11.9 % (ref 11.5–15)
ERYTHROCYTE [SEDIMENTATION RATE] IN BLOOD BY WESTERGREN METHOD: 4 MM/HR (ref 0–20)
FOLATE SERPL-MCNC: 14.2 NG/ML (ref 4.8–24.2)
GFR, ESTIMATED: >90 ML/MIN/1.73M2
GLUCOSE SERPL-MCNC: 115 MG/DL (ref 74–99)
HCT VFR BLD AUTO: 40.4 % (ref 34–48)
HGB BLD-MCNC: 13.4 G/DL (ref 11.5–15.5)
IMM GRANULOCYTES # BLD AUTO: <0.03 K/UL (ref 0–0.58)
IMM GRANULOCYTES NFR BLD: 0 % (ref 0–5)
LYMPHOCYTES NFR BLD: 2.88 K/UL (ref 1.5–4)
LYMPHOCYTES RELATIVE PERCENT: 42 % (ref 20–42)
MCH RBC QN AUTO: 30.2 PG (ref 26–35)
MCHC RBC AUTO-ENTMCNC: 33.2 G/DL (ref 32–34.5)
MCV RBC AUTO: 91 FL (ref 80–99.9)
MONOCYTES NFR BLD: 0.44 K/UL (ref 0.1–0.95)
MONOCYTES NFR BLD: 6 % (ref 2–12)
NEUTROPHILS NFR BLD: 51 % (ref 43–80)
NEUTS SEG NFR BLD: 3.49 K/UL (ref 1.8–7.3)
PLATELET # BLD AUTO: 287 K/UL (ref 130–450)
PMV BLD AUTO: 10.3 FL (ref 7–12)
POTASSIUM SERPL-SCNC: 4.1 MMOL/L (ref 3.5–5)
PROT SERPL-MCNC: 7.4 G/DL (ref 6.4–8.3)
PROT SERPL-MCNC: 7.9 G/DL (ref 6.4–8.3)
RBC # BLD AUTO: 4.44 M/UL (ref 3.5–5.5)
SODIUM SERPL-SCNC: 137 MMOL/L (ref 132–146)
VIT B12 SERPL-MCNC: 433 PG/ML (ref 211–946)
WBC OTHER # BLD: 6.9 K/UL (ref 4.5–11.5)

## 2025-01-20 PROCEDURE — 80053 COMPREHEN METABOLIC PANEL: CPT

## 2025-01-20 PROCEDURE — 82607 VITAMIN B-12: CPT

## 2025-01-20 PROCEDURE — 86140 C-REACTIVE PROTEIN: CPT

## 2025-01-20 PROCEDURE — 82306 VITAMIN D 25 HYDROXY: CPT

## 2025-01-20 PROCEDURE — 82746 ASSAY OF FOLIC ACID SERUM: CPT

## 2025-01-20 PROCEDURE — 85025 COMPLETE CBC W/AUTO DIFF WBC: CPT

## 2025-01-20 PROCEDURE — 85652 RBC SED RATE AUTOMATED: CPT

## 2025-01-20 PROCEDURE — 36415 COLL VENOUS BLD VENIPUNCTURE: CPT

## 2025-01-20 PROCEDURE — 80076 HEPATIC FUNCTION PANEL: CPT

## 2025-02-03 ASSESSMENT — PATIENT HEALTH QUESTIONNAIRE - PHQ9
SUM OF ALL RESPONSES TO PHQ QUESTIONS 1-9: 0
2. FEELING DOWN, DEPRESSED OR HOPELESS: NOT AT ALL
SUM OF ALL RESPONSES TO PHQ9 QUESTIONS 1 & 2: 0
1. LITTLE INTEREST OR PLEASURE IN DOING THINGS: NOT AT ALL
SUM OF ALL RESPONSES TO PHQ QUESTIONS 1-9: 0

## 2025-02-05 ASSESSMENT — PATIENT HEALTH QUESTIONNAIRE - PHQ9
1. LITTLE INTEREST OR PLEASURE IN DOING THINGS: NOT AT ALL
SUM OF ALL RESPONSES TO PHQ9 QUESTIONS 1 & 2: 0
2. FEELING DOWN, DEPRESSED OR HOPELESS: NOT AT ALL

## 2025-02-06 ENCOUNTER — OFFICE VISIT (OUTPATIENT)
Dept: PRIMARY CARE CLINIC | Age: 25
End: 2025-02-06

## 2025-02-06 VITALS
WEIGHT: 125 LBS | HEIGHT: 64 IN | SYSTOLIC BLOOD PRESSURE: 110 MMHG | OXYGEN SATURATION: 96 % | BODY MASS INDEX: 21.34 KG/M2 | RESPIRATION RATE: 18 BRPM | TEMPERATURE: 97 F | HEART RATE: 66 BPM | DIASTOLIC BLOOD PRESSURE: 62 MMHG

## 2025-02-06 DIAGNOSIS — R25.3 EYE MUSCLE TWITCHES: ICD-10-CM

## 2025-02-06 DIAGNOSIS — K51.20 ULCERATIVE PROCTITIS WITHOUT COMPLICATION (HCC): Primary | ICD-10-CM

## 2025-02-06 LAB
ALBUMIN: 4.4 G/DL (ref 3.5–5.2)
ALP BLD-CCNC: 49 U/L (ref 35–104)
ALT SERPL-CCNC: 7 U/L (ref 0–32)
ANION GAP SERPL CALCULATED.3IONS-SCNC: 14 MMOL/L (ref 7–16)
AST SERPL-CCNC: 24 U/L (ref 0–31)
BILIRUB SERPL-MCNC: 0.3 MG/DL (ref 0–1.2)
BUN BLDV-MCNC: 15 MG/DL (ref 6–20)
CALCIUM SERPL-MCNC: 9.2 MG/DL (ref 8.6–10.2)
CHLORIDE BLD-SCNC: 102 MMOL/L (ref 98–107)
CO2: 24 MMOL/L (ref 22–29)
CREAT SERPL-MCNC: 0.9 MG/DL (ref 0.5–1)
GFR, ESTIMATED: >90 ML/MIN/1.73M2
GLUCOSE BLD-MCNC: 91 MG/DL (ref 74–99)
MAGNESIUM: 2.1 MG/DL (ref 1.6–2.6)
POTASSIUM SERPL-SCNC: 4.4 MMOL/L (ref 3.5–5)
SODIUM BLD-SCNC: 140 MMOL/L (ref 132–146)
TOTAL PROTEIN: 7.5 G/DL (ref 6.4–8.3)
TSH SERPL DL<=0.05 MIU/L-ACNC: 2.69 UIU/ML (ref 0.27–4.2)
VITAMIN D 25-HYDROXY: 29.4 NG/ML (ref 30–100)

## 2025-02-06 SDOH — ECONOMIC STABILITY: FOOD INSECURITY: WITHIN THE PAST 12 MONTHS, YOU WORRIED THAT YOUR FOOD WOULD RUN OUT BEFORE YOU GOT MONEY TO BUY MORE.: NEVER TRUE

## 2025-02-06 SDOH — ECONOMIC STABILITY: FOOD INSECURITY: WITHIN THE PAST 12 MONTHS, THE FOOD YOU BOUGHT JUST DIDN'T LAST AND YOU DIDN'T HAVE MONEY TO GET MORE.: NEVER TRUE

## 2025-02-06 NOTE — PROGRESS NOTES
Chief Complaint   Patient presents with    Eye Problem     Right eye twitching for months        HPI:  Patient is here for 3 months of twitching of the left eye. She has normal vision. No lid drooping or changes. No dry eye sx. No photophobia. No med changes. She had electrolytes checked from GI about 1 month ago. She wears contacts. Denies excessive caffeine.     Patient's past medical, surgical, social and/or family history reviewed, updated in chart, and are non-contributory (unless otherwise stated).  Medications and allergies also reviewed and updated in chart.    Review of Systems:  Constitutional:  No fever, no fatigue, no chills, no headaches, no weight change  Dermatology:  No rash, no mole, no dry or sensitive skin  ENT:  No cough, no sore throat, no sinus pain, no runny nose, no ear pain  Cardiology:  No chest pain, no palpitations, no leg edema, no shortness of breath, no PND  Gastroenterology:  No dysphagia, no abdominal pain, no nausea, no vomiting, no constipation, no diarrhea, no heartburn  Musculoskeletal:  No joint pain, no leg cramps, no back pain, no muscle aches  Respiratory:  No shortness of breath, no orthopnea, no wheezing, no GOMEZ, no hemoptysis  Urology:  No blood in the urine, no urinary frequency, no urinary incontinence, no urinary urgency, no nocturia, no dysuria    Vitals:    02/06/25 0818   BP: 110/62   Pulse: 66   Resp: 18   Temp: 97 °F (36.1 °C)   SpO2: 96%   Weight: 56.7 kg (125 lb)   Height: 1.638 m (5' 4.47\")       Physical Exam    Assessment/Plan:      Cele was seen today for eye problem.    Diagnoses and all orders for this visit:    Ulcerative proctitis without complication (HCC)    Eye muscle twitches  -     Comprehensive Metabolic Panel  -     TSH  -     Vitamin D 25 Hydroxy  -     Magnesium    -will check electrolytes bc she is on aldactone  As above.  Call or go to ED immediately if symptoms worsen or persist.  Return if symptoms worsen or fail to improve., or

## 2025-04-16 ENCOUNTER — TELEMEDICINE (OUTPATIENT)
Age: 25
End: 2025-04-16
Payer: COMMERCIAL

## 2025-04-16 DIAGNOSIS — K51.20 ULCERATIVE PROCTITIS WITHOUT COMPLICATION (HCC): Primary | ICD-10-CM

## 2025-04-16 PROCEDURE — 99213 OFFICE O/P EST LOW 20 MIN: CPT | Performed by: NURSE PRACTITIONER

## 2025-04-16 RX ORDER — MESALAMINE 1.2 G/1
4800 TABLET, DELAYED RELEASE ORAL
Qty: 360 TABLET | Refills: 3 | Status: SHIPPED | OUTPATIENT
Start: 2025-04-16

## 2025-04-16 RX ORDER — ERGOCALCIFEROL 1.25 MG/1
50000 CAPSULE, LIQUID FILLED ORAL WEEKLY
Qty: 12 CAPSULE | Refills: 1 | Status: SHIPPED | OUTPATIENT
Start: 2025-04-16

## 2025-04-16 ASSESSMENT — ENCOUNTER SYMPTOMS: GASTROINTESTINAL NEGATIVE: 1

## 2025-04-16 NOTE — PROGRESS NOTES
Cele Root, was evaluated through a synchronous (real-time) audio-video encounter. The patient (or guardian if applicable) is aware that this is a billable service, which includes applicable co-pays. This Virtual Visit was conducted with patient's (and/or legal guardian's) consent. Patient identification was verified, and a caregiver was present when appropriate.   The patient was located at Home: [address]  [place]  [place] [address]  Provider was located at Facility (Appt Dept):  [place] [address]  Confirm you are appropriately licensed, registered, or certified to deliver care in the state where the patient is located as indicated above. If you are not or unsure, please re-schedule the visit: Yes, I confirm.     Cele Root (:  2000) is a Established patient, presenting virtually for evaluation of the following:      Below is the assessment and plan developed based on review of pertinent history, physical exam, labs, studies, and medications.     Assessment & Plan  Ulcerative proctitis without complication (HCC)     -continue Mesalamine 4 tabs daily.  May use RS as needed  -routine IBD lab work ordered for July  -once a week vitamin D supplement prescribed with instructions  -will consider surveillance flexible sigmoidoscopy next year    Orders:    Comprehensive Metabolic Panel; Future    CBC with Auto Differential; Future    Hepatic Function Panel; Future    Vitamin D 25 Hydroxy; Future    Vitamin B12 & Folate; Future    C-Reactive Protein; Future    Sedimentation Rate; Future    mesalamine (LIALDA) 1.2 g EC tablet; Take 4 tablets by mouth daily (with breakfast)      Return for Follow up secured.       Subjective   HPI    Cele is a very pleasant 24 year old female that presents today for follow up on ulcerative colitis    Patient is currently taking Mesalamine.4 tabs daily  Denies BRBPR, abdominal pain, or melena.  Moving bowels once daily.  Stools

## 2025-04-16 NOTE — ASSESSMENT & PLAN NOTE
-continue Mesalamine 4 tabs daily.  May use RS as needed  -routine IBD lab work ordered for July  -once a week vitamin D supplement prescribed with instructions  -will consider surveillance flexible sigmoidoscopy next year    Orders:    Comprehensive Metabolic Panel; Future    CBC with Auto Differential; Future    Hepatic Function Panel; Future    Vitamin D 25 Hydroxy; Future    Vitamin B12 & Folate; Future    C-Reactive Protein; Future    Sedimentation Rate; Future    mesalamine (LIALDA) 1.2 g EC tablet; Take 4 tablets by mouth daily (with breakfast)

## 2025-08-19 ENCOUNTER — OFFICE VISIT (OUTPATIENT)
Dept: FAMILY MEDICINE CLINIC | Age: 25
End: 2025-08-19
Payer: COMMERCIAL

## 2025-08-19 VITALS
RESPIRATION RATE: 18 BRPM | DIASTOLIC BLOOD PRESSURE: 66 MMHG | BODY MASS INDEX: 23.05 KG/M2 | WEIGHT: 135 LBS | HEART RATE: 86 BPM | HEIGHT: 64 IN | OXYGEN SATURATION: 97 % | TEMPERATURE: 97.4 F | SYSTOLIC BLOOD PRESSURE: 108 MMHG

## 2025-08-19 DIAGNOSIS — J06.9 URI WITH COUGH AND CONGESTION: Primary | ICD-10-CM

## 2025-08-19 PROCEDURE — 99203 OFFICE O/P NEW LOW 30 MIN: CPT

## 2025-08-19 RX ORDER — METHYLPREDNISOLONE 4 MG/1
TABLET ORAL
Qty: 1 KIT | Refills: 0 | Status: SHIPPED | OUTPATIENT
Start: 2025-08-19

## 2025-08-19 RX ORDER — BENZONATATE 200 MG/1
200 CAPSULE ORAL EVERY 8 HOURS PRN
Qty: 30 CAPSULE | Refills: 0 | Status: SHIPPED | OUTPATIENT
Start: 2025-08-19 | End: 2025-08-29

## 2025-08-19 RX ORDER — CEFDINIR 300 MG/1
300 CAPSULE ORAL EVERY 12 HOURS
Qty: 14 CAPSULE | Refills: 0 | Status: SHIPPED | OUTPATIENT
Start: 2025-08-19 | End: 2025-08-26

## 2025-09-03 RX ORDER — ERGOCALCIFEROL 1.25 MG/1
50000 CAPSULE, LIQUID FILLED ORAL WEEKLY
Qty: 12 CAPSULE | Refills: 1 | Status: SHIPPED | OUTPATIENT
Start: 2025-09-03

## (undated) DEVICE — CONTAINER SPEC 60ML PH 7NEUTRAL BUFF FRMLN RDY TO USE

## (undated) DEVICE — GAUZE,SPONGE,4"X4",8PLY,STRL,LF,10/TRAY: Brand: MEDLINE

## (undated) DEVICE — FORCEPS BX L160CM JAW DIA2.4MM YEL L CAP W/ NDL DISP RAD

## (undated) DEVICE — CONNECTOR IRRIGATION AUXILIARY H2O JET W/ PRT MTL THRD HYDR

## (undated) DEVICE — Z DISCONTINUED NO SUB IDED TUBING ETCO2 AD L6.5FT NSL ORAL CVD PRNG NONFLARED TIP OVR

## (undated) DEVICE — DEFENDO AIR WATER SUCTION AND BIOPSY VALVE KIT FOR  OLYMPUS: Brand: DEFENDO AIR/WATER/SUCTION AND BIOPSY VALVE

## (undated) DEVICE — SPONGE GZ W4XL4IN RAYON POLY FILL CVR W/ NONWOVEN FAB

## (undated) DEVICE — FORCEPS BX L240CM JAW DIA2.4MM WRK CHN 2.8MM ORNG L CAP W/

## (undated) DEVICE — FORCEPS BX L L240CM DIA2.4MM RAD JAW 4 HOT FOR POLYP DISP